# Patient Record
Sex: FEMALE | Race: WHITE | Employment: FULL TIME | URBAN - METROPOLITAN AREA
[De-identification: names, ages, dates, MRNs, and addresses within clinical notes are randomized per-mention and may not be internally consistent; named-entity substitution may affect disease eponyms.]

---

## 2022-03-28 ENCOUNTER — OFFICE VISIT (OUTPATIENT)
Dept: FAMILY MEDICINE CLINIC | Age: 47
End: 2022-03-28
Payer: COMMERCIAL

## 2022-03-28 VITALS
HEIGHT: 63 IN | DIASTOLIC BLOOD PRESSURE: 84 MMHG | SYSTOLIC BLOOD PRESSURE: 130 MMHG | HEART RATE: 90 BPM | OXYGEN SATURATION: 98 % | WEIGHT: 176 LBS | BODY MASS INDEX: 31.18 KG/M2

## 2022-03-28 DIAGNOSIS — Z13.29 SCREENING FOR THYROID DISORDER: ICD-10-CM

## 2022-03-28 DIAGNOSIS — Z13.220 SCREENING FOR LIPID DISORDERS: ICD-10-CM

## 2022-03-28 DIAGNOSIS — Z13.1 SCREENING FOR DIABETES MELLITUS: ICD-10-CM

## 2022-03-28 DIAGNOSIS — Z00.00 ENCOUNTER FOR WELL ADULT EXAM WITHOUT ABNORMAL FINDINGS: Primary | ICD-10-CM

## 2022-03-28 DIAGNOSIS — H61.21 IMPACTED CERUMEN OF RIGHT EAR: ICD-10-CM

## 2022-03-28 DIAGNOSIS — Z12.11 SCREENING FOR COLON CANCER: ICD-10-CM

## 2022-03-28 PROCEDURE — 69209 REMOVE IMPACTED EAR WAX UNI: CPT | Performed by: NURSE PRACTITIONER

## 2022-03-28 PROCEDURE — 99214 OFFICE O/P EST MOD 30 MIN: CPT | Performed by: NURSE PRACTITIONER

## 2022-03-28 RX ORDER — ESCITALOPRAM OXALATE 5 MG/1
5 TABLET ORAL DAILY
COMMUNITY

## 2022-03-28 RX ORDER — OXYBUTYNIN CHLORIDE 5 MG/1
TABLET, EXTENDED RELEASE ORAL
COMMUNITY
Start: 2022-01-27

## 2022-03-28 ASSESSMENT — PATIENT HEALTH QUESTIONNAIRE - PHQ9
SUM OF ALL RESPONSES TO PHQ9 QUESTIONS 1 & 2: 0
2. FEELING DOWN, DEPRESSED OR HOPELESS: 0
SUM OF ALL RESPONSES TO PHQ QUESTIONS 1-9: 0
1. LITTLE INTEREST OR PLEASURE IN DOING THINGS: 0

## 2022-03-28 ASSESSMENT — ENCOUNTER SYMPTOMS
GASTROINTESTINAL NEGATIVE: 1
RESPIRATORY NEGATIVE: 1
EYES NEGATIVE: 1

## 2022-03-28 NOTE — PROGRESS NOTES
Well Adult Note  Name: Larry Christopher Date: 3/28/2022   MRN: [id number] Sex: Female   Age: 42 y.o. Ethnicity: Non- / Non    : 1975 Race: White (non-)      Penny Martin is here for well adult exam.  History:  Patient with a medical hx of urinary incontinence anxiety and depression is here for a physical . She needs physical for work. She denied any complains       Review of Systems   Constitutional: Negative. HENT: Negative. Eyes: Negative. Respiratory: Negative. Cardiovascular: Negative. Gastrointestinal: Negative. Endocrine: Negative. Genitourinary: Negative. Musculoskeletal: Negative. Skin: Negative. Neurological: Negative. Hematological: Negative. Psychiatric/Behavioral: Negative. No Known Allergies      Prior to Visit Medications    Medication Sig Taking? Authorizing Provider   oxybutynin (DITROPAN-XL) 5 MG extended release tablet TAKE 1 TABLET BY MOUTH EVERY DAY Yes Historical Provider, MD   escitalopram (LEXAPRO) 5 MG tablet Take 5 mg by mouth daily  Patient not taking: Reported on 3/28/2022  Historical Provider, MD       History reviewed. No pertinent past medical history. Past Surgical History:   Procedure Laterality Date    HYSTERECTOMY, VAGINAL      TUBAL LIGATION           Family History   Problem Relation Age of Onset    No Known Problems Mother     Epilepsy Father     Stroke Father     Dementia Father     No Known Problems Sister     No Known Problems Brother     No Known Problems Daughter     No Known Problems Son     No Known Problems Sister     No Known Problems Brother        Social History     Tobacco Use    Smoking status: Never Smoker    Smokeless tobacco: Never Used   Vaping Use    Vaping Use: Not on file   Substance Use Topics    Alcohol use:  Yes     Alcohol/week: 4.0 - 6.0 standard drinks     Types: 4 - 6 Cans of beer per week     Comment: lian    Drug use: Never       Objective Cervical cancer screen  Never done    Diabetes screen  Never done    Lipid screen  Never done    Flu vaccine (1) 03/28/2023 (Originally 9/1/2021)    Hepatitis A vaccine  Aged Out    Hepatitis B vaccine  Aged Out    Hib vaccine  Aged Out    Meningococcal (ACWY) vaccine  Aged Out    Pneumococcal 0-64 years Vaccine  Aged Out    Hepatitis C screen  Discontinued    HIV screen  Discontinued     Recommendations for Preventive Services Due: see orders and patient instructions/AVS.    No follow-ups on file.

## 2022-03-29 DIAGNOSIS — Z13.220 SCREENING FOR LIPID DISORDERS: ICD-10-CM

## 2022-03-29 DIAGNOSIS — E03.9 HYPOTHYROIDISM, UNSPECIFIED TYPE: Primary | ICD-10-CM

## 2022-03-29 LAB
A/G RATIO: 1.6 (ref 1.1–2.2)
ALBUMIN SERPL-MCNC: 4.4 G/DL (ref 3.4–5)
ALP BLD-CCNC: 49 U/L (ref 40–129)
ALT SERPL-CCNC: 19 U/L (ref 10–40)
ANION GAP SERPL CALCULATED.3IONS-SCNC: 17 MMOL/L (ref 3–16)
AST SERPL-CCNC: 23 U/L (ref 15–37)
BASOPHILS ABSOLUTE: 0.1 K/UL (ref 0–0.2)
BASOPHILS RELATIVE PERCENT: 0.9 %
BILIRUB SERPL-MCNC: 0.4 MG/DL (ref 0–1)
BUN BLDV-MCNC: 12 MG/DL (ref 7–20)
CALCIUM SERPL-MCNC: 9.5 MG/DL (ref 8.3–10.6)
CHLORIDE BLD-SCNC: 108 MMOL/L (ref 99–110)
CHOLESTEROL, TOTAL: 210 MG/DL (ref 0–199)
CO2: 19 MMOL/L (ref 21–32)
CREAT SERPL-MCNC: 0.7 MG/DL (ref 0.6–1.1)
EOSINOPHILS ABSOLUTE: 0.4 K/UL (ref 0–0.6)
EOSINOPHILS RELATIVE PERCENT: 5.6 %
ESTIMATED AVERAGE GLUCOSE: 108.3 MG/DL
GFR AFRICAN AMERICAN: >60
GFR NON-AFRICAN AMERICAN: >60
GLUCOSE BLD-MCNC: 90 MG/DL (ref 70–99)
HBA1C MFR BLD: 5.4 %
HCT VFR BLD CALC: 37.1 % (ref 36–48)
HDLC SERPL-MCNC: 75 MG/DL (ref 40–60)
HEMOGLOBIN: 12.3 G/DL (ref 12–16)
LDL CHOLESTEROL CALCULATED: 119 MG/DL
LYMPHOCYTES ABSOLUTE: 2.4 K/UL (ref 1–5.1)
LYMPHOCYTES RELATIVE PERCENT: 29.5 %
MCH RBC QN AUTO: 27.1 PG (ref 26–34)
MCHC RBC AUTO-ENTMCNC: 33.1 G/DL (ref 31–36)
MCV RBC AUTO: 81.9 FL (ref 80–100)
MONOCYTES ABSOLUTE: 0.4 K/UL (ref 0–1.3)
MONOCYTES RELATIVE PERCENT: 5.2 %
NEUTROPHILS ABSOLUTE: 4.7 K/UL (ref 1.7–7.7)
NEUTROPHILS RELATIVE PERCENT: 58.8 %
PDW BLD-RTO: 15.2 % (ref 12.4–15.4)
PLATELET # BLD: 333 K/UL (ref 135–450)
PMV BLD AUTO: 9.2 FL (ref 5–10.5)
POTASSIUM SERPL-SCNC: 4.2 MMOL/L (ref 3.5–5.1)
RBC # BLD: 4.54 M/UL (ref 4–5.2)
SODIUM BLD-SCNC: 144 MMOL/L (ref 136–145)
T4 FREE: 1.1 NG/DL (ref 0.9–1.8)
TOTAL PROTEIN: 7.2 G/DL (ref 6.4–8.2)
TRIGL SERPL-MCNC: 80 MG/DL (ref 0–150)
TSH SERPL DL<=0.05 MIU/L-ACNC: 5.21 UIU/ML (ref 0.27–4.2)
VLDLC SERPL CALC-MCNC: 16 MG/DL
WBC # BLD: 8 K/UL (ref 4–11)

## 2022-03-29 RX ORDER — LEVOTHYROXINE SODIUM 0.03 MG/1
25 TABLET ORAL DAILY
Qty: 30 TABLET | Refills: 5 | Status: SHIPPED | OUTPATIENT
Start: 2022-03-29

## 2023-03-06 ENCOUNTER — TELEPHONE (OUTPATIENT)
Dept: FAMILY MEDICINE CLINIC | Age: 48
End: 2023-03-06

## 2023-03-06 NOTE — TELEPHONE ENCOUNTER
Attempted to call pt, rang once, someone picked up phone then hung up    Last physical was 03/28/2022, pt cannot have physical before then due to insurance coverage

## 2023-03-06 NOTE — TELEPHONE ENCOUNTER
----- Message from Mariposa Foster sent at 3/6/2023  8:32 AM EST -----  Subject: Message to Provider    QUESTIONS  Information for Provider? Patient requesting to reschedule her appointment   on 3/29/2023 2:30 PM if possible to be with her PCP for her annual   physical before 3/31/23.   ---------------------------------------------------------------------------  --------------  Michael Love INFO  [phone]; OK to leave message on voicemail  ---------------------------------------------------------------------------  --------------  SCRIPT ANSWERS  Relationship to Patient?  Self

## 2023-03-29 ENCOUNTER — OFFICE VISIT (OUTPATIENT)
Dept: FAMILY MEDICINE CLINIC | Age: 48
End: 2023-03-29

## 2023-03-29 VITALS
HEIGHT: 63 IN | SYSTOLIC BLOOD PRESSURE: 116 MMHG | BODY MASS INDEX: 30.79 KG/M2 | DIASTOLIC BLOOD PRESSURE: 82 MMHG | HEART RATE: 93 BPM | OXYGEN SATURATION: 98 % | RESPIRATION RATE: 16 BRPM | WEIGHT: 173.8 LBS

## 2023-03-29 DIAGNOSIS — E03.9 HYPOTHYROIDISM, UNSPECIFIED TYPE: ICD-10-CM

## 2023-03-29 DIAGNOSIS — Z00.00 PREVENTATIVE HEALTH CARE: ICD-10-CM

## 2023-03-29 DIAGNOSIS — Z12.11 SCREENING FOR COLON CANCER: Primary | ICD-10-CM

## 2023-03-29 DIAGNOSIS — F32.0 CURRENT MILD EPISODE OF MAJOR DEPRESSIVE DISORDER WITHOUT PRIOR EPISODE (HCC): ICD-10-CM

## 2023-03-29 DIAGNOSIS — H61.21 IMPACTED CERUMEN, RIGHT EAR: ICD-10-CM

## 2023-03-29 DIAGNOSIS — Z13.220 SCREENING FOR LIPID DISORDERS: ICD-10-CM

## 2023-03-29 RX ORDER — LEVOTHYROXINE SODIUM 0.03 MG/1
25 TABLET ORAL DAILY
Qty: 30 TABLET | Refills: 5 | Status: SHIPPED | OUTPATIENT
Start: 2023-03-29

## 2023-03-29 RX ORDER — ESCITALOPRAM OXALATE 20 MG/1
20 TABLET ORAL DAILY
Qty: 30 TABLET | Refills: 0 | Status: SHIPPED | OUTPATIENT
Start: 2023-03-29 | End: 2023-04-28

## 2023-03-29 RX ORDER — OXYBUTYNIN CHLORIDE 10 MG/1
10 TABLET, EXTENDED RELEASE ORAL DAILY
Qty: 30 TABLET | Refills: 3 | Status: SHIPPED | OUTPATIENT
Start: 2023-03-29

## 2023-03-29 ASSESSMENT — PATIENT HEALTH QUESTIONNAIRE - PHQ9
SUM OF ALL RESPONSES TO PHQ9 QUESTIONS 1 & 2: 6
SUM OF ALL RESPONSES TO PHQ QUESTIONS 1-9: 16
8. MOVING OR SPEAKING SO SLOWLY THAT OTHER PEOPLE COULD HAVE NOTICED. OR THE OPPOSITE, BEING SO FIGETY OR RESTLESS THAT YOU HAVE BEEN MOVING AROUND A LOT MORE THAN USUAL: 1
2. FEELING DOWN, DEPRESSED OR HOPELESS: 3
3. TROUBLE FALLING OR STAYING ASLEEP: 3
9. THOUGHTS THAT YOU WOULD BE BETTER OFF DEAD, OR OF HURTING YOURSELF: 0
1. LITTLE INTEREST OR PLEASURE IN DOING THINGS: 3
SUM OF ALL RESPONSES TO PHQ QUESTIONS 1-9: 16
5. POOR APPETITE OR OVEREATING: 2
SUM OF ALL RESPONSES TO PHQ QUESTIONS 1-9: 16
7. TROUBLE CONCENTRATING ON THINGS, SUCH AS READING THE NEWSPAPER OR WATCHING TELEVISION: 1
10. IF YOU CHECKED OFF ANY PROBLEMS, HOW DIFFICULT HAVE THESE PROBLEMS MADE IT FOR YOU TO DO YOUR WORK, TAKE CARE OF THINGS AT HOME, OR GET ALONG WITH OTHER PEOPLE: 1
4. FEELING TIRED OR HAVING LITTLE ENERGY: 3
SUM OF ALL RESPONSES TO PHQ QUESTIONS 1-9: 16
6. FEELING BAD ABOUT YOURSELF - OR THAT YOU ARE A FAILURE OR HAVE LET YOURSELF OR YOUR FAMILY DOWN: 0

## 2023-03-29 ASSESSMENT — ENCOUNTER SYMPTOMS
COUGH: 0
WHEEZING: 0
ABDOMINAL PAIN: 0
COLOR CHANGE: 0
SORE THROAT: 0
SHORTNESS OF BREATH: 0
BLOOD IN STOOL: 0
DIARRHEA: 0
CONSTIPATION: 0

## 2023-03-29 NOTE — PROGRESS NOTES
Well Adult Note  Name: Becca Foster Date: 3/29/2023   MRN: [id number] Sex: Female   Age: 41 y.o. Ethnicity: Non- / Non    : 1975 Race: White (non-)      Yenifer List is here for well adult exam.  History: Hypothyroidism/anxiety and depression. Review of Systems   HENT:  Negative for congestion and sore throat. Respiratory:  Negative for cough, shortness of breath and wheezing. Cardiovascular:  Negative for chest pain and leg swelling. Gastrointestinal:  Negative for abdominal pain, blood in stool, constipation and diarrhea. Endocrine: Negative for cold intolerance and heat intolerance. Genitourinary:  Negative for difficulty urinating, hematuria and urgency. Musculoskeletal:  Negative for arthralgias and gait problem. Skin:  Negative for color change. Neurological:  Negative for dizziness, seizures, light-headedness and headaches. Psychiatric/Behavioral:  Negative for agitation and confusion. The patient is not nervous/anxious. No Known Allergies      Prior to Visit Medications    Medication Sig Taking? Authorizing Provider   levothyroxine (SYNTHROID) 25 MCG tablet Take 1 tablet by mouth daily Yes TWIN Ramos CNP   escitalopram (LEXAPRO) 20 MG tablet Take 1 tablet by mouth daily Yes TWIN Ramos CNP   oxybutynin (DITROPAN XL) 10 MG extended release tablet Take 1 tablet by mouth daily Yes TWIN Ramos CNP       History reviewed. No pertinent past medical history.     Past Surgical History:   Procedure Laterality Date    HYSTERECTOMY, VAGINAL      TUBAL LIGATION           Family History   Problem Relation Age of Onset    No Known Problems Mother     Epilepsy Father     Stroke Father     Dementia Father     No Known Problems Sister     No Known Problems Brother     No Known Problems Daughter     No Known Problems Son     No Known Problems Sister     No Known Problems Brother        Social History     Tobacco Use

## 2023-03-30 LAB
ALBUMIN SERPL-MCNC: 4.6 G/DL (ref 3.4–5)
ALBUMIN/GLOB SERPL: 1.8 {RATIO} (ref 1.1–2.2)
ALP SERPL-CCNC: 51 U/L (ref 40–129)
ALT SERPL-CCNC: 36 U/L (ref 10–40)
ANION GAP SERPL CALCULATED.3IONS-SCNC: 14 MMOL/L (ref 3–16)
AST SERPL-CCNC: 25 U/L (ref 15–37)
BASOPHILS # BLD: 0.1 K/UL (ref 0–0.2)
BASOPHILS NFR BLD: 0.7 %
BILIRUB SERPL-MCNC: 0.4 MG/DL (ref 0–1)
BUN SERPL-MCNC: 14 MG/DL (ref 7–20)
CALCIUM SERPL-MCNC: 9.7 MG/DL (ref 8.3–10.6)
CHLORIDE SERPL-SCNC: 104 MMOL/L (ref 99–110)
CHOLEST SERPL-MCNC: 193 MG/DL (ref 0–199)
CO2 SERPL-SCNC: 23 MMOL/L (ref 21–32)
CREAT SERPL-MCNC: 0.7 MG/DL (ref 0.6–1.1)
DEPRECATED RDW RBC AUTO: 15.3 % (ref 12.4–15.4)
EOSINOPHIL # BLD: 0.1 K/UL (ref 0–0.6)
EOSINOPHIL NFR BLD: 1.7 %
GFR SERPLBLD CREATININE-BSD FMLA CKD-EPI: >60 ML/MIN/{1.73_M2}
GLUCOSE SERPL-MCNC: 87 MG/DL (ref 70–99)
HCT VFR BLD AUTO: 41.6 % (ref 36–48)
HDLC SERPL-MCNC: 60 MG/DL (ref 40–60)
HGB BLD-MCNC: 13.4 G/DL (ref 12–16)
LDLC SERPL CALC-MCNC: 118 MG/DL
LYMPHOCYTES # BLD: 2.6 K/UL (ref 1–5.1)
LYMPHOCYTES NFR BLD: 29.1 %
MCH RBC QN AUTO: 27.6 PG (ref 26–34)
MCHC RBC AUTO-ENTMCNC: 32.1 G/DL (ref 31–36)
MCV RBC AUTO: 85.9 FL (ref 80–100)
MONOCYTES # BLD: 0.5 K/UL (ref 0–1.3)
MONOCYTES NFR BLD: 5.7 %
NEUTROPHILS # BLD: 5.6 K/UL (ref 1.7–7.7)
NEUTROPHILS NFR BLD: 62.8 %
PLATELET # BLD AUTO: 310 K/UL (ref 135–450)
PMV BLD AUTO: 10.1 FL (ref 5–10.5)
POTASSIUM SERPL-SCNC: 4.5 MMOL/L (ref 3.5–5.1)
PROT SERPL-MCNC: 7.2 G/DL (ref 6.4–8.2)
RBC # BLD AUTO: 4.85 M/UL (ref 4–5.2)
SODIUM SERPL-SCNC: 141 MMOL/L (ref 136–145)
TRIGL SERPL-MCNC: 73 MG/DL (ref 0–150)
TSH SERPL DL<=0.005 MIU/L-ACNC: 2.5 UIU/ML (ref 0.27–4.2)
VLDLC SERPL CALC-MCNC: 15 MG/DL
WBC # BLD AUTO: 8.9 K/UL (ref 4–11)

## 2023-04-14 ENCOUNTER — TELEPHONE (OUTPATIENT)
Dept: FAMILY MEDICINE CLINIC | Age: 48
End: 2023-04-14

## 2023-04-19 ENCOUNTER — TELEPHONE (OUTPATIENT)
Dept: FAMILY MEDICINE CLINIC | Age: 48
End: 2023-04-19

## 2023-05-01 ENCOUNTER — OFFICE VISIT (OUTPATIENT)
Dept: FAMILY MEDICINE CLINIC | Age: 48
End: 2023-05-01
Payer: COMMERCIAL

## 2023-05-01 VITALS
BODY MASS INDEX: 30.44 KG/M2 | DIASTOLIC BLOOD PRESSURE: 72 MMHG | SYSTOLIC BLOOD PRESSURE: 136 MMHG | OXYGEN SATURATION: 99 % | RESPIRATION RATE: 16 BRPM | HEART RATE: 75 BPM | WEIGHT: 171.8 LBS | HEIGHT: 63 IN

## 2023-05-01 DIAGNOSIS — F41.9 ANXIETY AND DEPRESSION: Primary | ICD-10-CM

## 2023-05-01 DIAGNOSIS — F32.A ANXIETY AND DEPRESSION: Primary | ICD-10-CM

## 2023-05-01 PROCEDURE — 99214 OFFICE O/P EST MOD 30 MIN: CPT

## 2023-05-01 RX ORDER — VENLAFAXINE HYDROCHLORIDE 37.5 MG/1
37.5 CAPSULE, EXTENDED RELEASE ORAL DAILY
Qty: 30 CAPSULE | Refills: 0 | Status: SHIPPED | OUTPATIENT
Start: 2023-05-01

## 2023-05-01 RX ORDER — BUSPIRONE HYDROCHLORIDE 10 MG/1
10 TABLET ORAL 2 TIMES DAILY
Qty: 60 TABLET | Refills: 0 | Status: SHIPPED | OUTPATIENT
Start: 2023-05-01 | End: 2023-05-31

## 2023-05-01 ASSESSMENT — PATIENT HEALTH QUESTIONNAIRE - PHQ9
1. LITTLE INTEREST OR PLEASURE IN DOING THINGS: 3
SUM OF ALL RESPONSES TO PHQ QUESTIONS 1-9: 17
3. TROUBLE FALLING OR STAYING ASLEEP: 3
6. FEELING BAD ABOUT YOURSELF - OR THAT YOU ARE A FAILURE OR HAVE LET YOURSELF OR YOUR FAMILY DOWN: 2
8. MOVING OR SPEAKING SO SLOWLY THAT OTHER PEOPLE COULD HAVE NOTICED. OR THE OPPOSITE, BEING SO FIGETY OR RESTLESS THAT YOU HAVE BEEN MOVING AROUND A LOT MORE THAN USUAL: 0
4. FEELING TIRED OR HAVING LITTLE ENERGY: 3
2. FEELING DOWN, DEPRESSED OR HOPELESS: 2
7. TROUBLE CONCENTRATING ON THINGS, SUCH AS READING THE NEWSPAPER OR WATCHING TELEVISION: 1
SUM OF ALL RESPONSES TO PHQ9 QUESTIONS 1 & 2: 5
9. THOUGHTS THAT YOU WOULD BE BETTER OFF DEAD, OR OF HURTING YOURSELF: 0
SUM OF ALL RESPONSES TO PHQ QUESTIONS 1-9: 17
10. IF YOU CHECKED OFF ANY PROBLEMS, HOW DIFFICULT HAVE THESE PROBLEMS MADE IT FOR YOU TO DO YOUR WORK, TAKE CARE OF THINGS AT HOME, OR GET ALONG WITH OTHER PEOPLE: 3
SUM OF ALL RESPONSES TO PHQ QUESTIONS 1-9: 17
5. POOR APPETITE OR OVEREATING: 3
SUM OF ALL RESPONSES TO PHQ QUESTIONS 1-9: 17

## 2023-05-01 ASSESSMENT — ENCOUNTER SYMPTOMS
SORE THROAT: 0
CONSTIPATION: 0
COLOR CHANGE: 0
BLOOD IN STOOL: 0
SHORTNESS OF BREATH: 0
ABDOMINAL PAIN: 0
COUGH: 0
WHEEZING: 0
DIARRHEA: 0

## 2023-05-01 NOTE — PROGRESS NOTES
Joshua Fregoso (:  1975) is a 41 y.o. female,Established patient, here for evaluation of the following chief complaint(s): Anxiety (Pt is here for anxiety ) and Depression         ASSESSMENT/PLAN:  1. Anxiety and depression  -     venlafaxine (EFFEXOR XR) 37.5 MG extended release capsule; Take 1 capsule by mouth daily, Disp-30 capsule, R-0Normal  -     busPIRone (BUSPAR) 10 MG tablet; Take 1 tablet by mouth 2 times daily, Disp-60 tablet, R-0Normal  -Patient's anxiety and depression were not currently managed with her Lexapro 20 mg.  I do have some concern of unstable mood. However we will plan to start patient on Effexor and buspirone. Patient was educated if she has worsening manic episodes or increase in risky behavior that she needs to reach out to the office right away. Patient could benefit from mood stabilizing medication. Would consider Abilify or Viibryd if patient does not do well or in conjunction with Effexor and buspirone. Patient was again educated on side effects of medications listed above. Patient is agreeable and understands plan at this time. Patient was educated if she has worsening thoughts of harming her self or others to seek care right away. Patient is agreeable and understands plan at this time. Did spend lengthy amount of time with patient discussing her current and past history that has led her to seek help. Return in about 4 weeks (around 2023) for Mood . Subjective   SUBJECTIVE/OBJECTIVE:  HPI  Patient presents to the office today regarding her mood. During patient's last visit her Lexapro dose was increased from 10 to 20 mg. Patient presents to the office today to have further discussion regarding her mood. During patient's first office visit patient was reluctant to share status over her mood. Patient presents to the office today further willing to discuss her mood. Patient is suffering from depression and anxiety.   Patient has a number of

## 2023-05-04 ENCOUNTER — TELEPHONE (OUTPATIENT)
Dept: FAMILY MEDICINE CLINIC | Age: 48
End: 2023-05-04

## 2023-05-04 NOTE — TELEPHONE ENCOUNTER
Patient was informed that her [facility] paperwork was faxed on 5/4/2023 and she just wanted to let GAYATRI Forrester know that she was not able to go into work today.     DEBORAH

## 2023-05-09 ENCOUNTER — TELEPHONE (OUTPATIENT)
Dept: FAMILY MEDICINE CLINIC | Age: 48
End: 2023-05-09

## 2023-05-09 DIAGNOSIS — F32.0 CURRENT MILD EPISODE OF MAJOR DEPRESSIVE DISORDER WITHOUT PRIOR EPISODE (HCC): ICD-10-CM

## 2023-05-09 RX ORDER — ESCITALOPRAM OXALATE 20 MG/1
TABLET ORAL
Qty: 30 TABLET | Refills: 0 | OUTPATIENT
Start: 2023-05-09

## 2023-05-09 NOTE — TELEPHONE ENCOUNTER
Pt states she needs FMLA paperwork to take days off at her work until May 15th which is when she has appointment with her psychiatrist. Pt states she does not go to work a lot because she has no motivation or energy to even get up from the bed. She feels anxious and withdrawn. She often has meltdowns. She is taking her buspirone and venlafaxine regularly, but it is only helping her calm down. It does not help with low energy.

## 2023-05-12 ENCOUNTER — OFFICE VISIT (OUTPATIENT)
Dept: FAMILY MEDICINE CLINIC | Age: 48
End: 2023-05-12
Payer: COMMERCIAL

## 2023-05-12 VITALS
RESPIRATION RATE: 16 BRPM | HEART RATE: 84 BPM | HEIGHT: 63 IN | OXYGEN SATURATION: 98 % | SYSTOLIC BLOOD PRESSURE: 126 MMHG | DIASTOLIC BLOOD PRESSURE: 82 MMHG | WEIGHT: 169.6 LBS | BODY MASS INDEX: 30.05 KG/M2

## 2023-05-12 DIAGNOSIS — F41.9 ANXIETY AND DEPRESSION: ICD-10-CM

## 2023-05-12 DIAGNOSIS — F32.A ANXIETY AND DEPRESSION: ICD-10-CM

## 2023-05-12 PROCEDURE — 99213 OFFICE O/P EST LOW 20 MIN: CPT

## 2023-05-12 ASSESSMENT — ENCOUNTER SYMPTOMS
SHORTNESS OF BREATH: 0
ABDOMINAL PAIN: 0
COUGH: 0
DIARRHEA: 0
WHEEZING: 0
COLOR CHANGE: 0
SORE THROAT: 0
BLOOD IN STOOL: 0
CONSTIPATION: 0

## 2023-05-12 NOTE — PROGRESS NOTES
Lubna Bullock (:  1975) is a 41 y.o. female,Established patient, here for evaluation of the following chief complaint(s): Anxiety (Pt is here to discuss medications for anxiety and depression as well as FMLA for her work ) and Depression         ASSESSMENT/PLAN:  1. Anxiety and depression  -Patient describes having little energy and lack of motivation. This is causing her to miss work. Patient states that she was normally doing fairly well with her mood was able to push through but recently she does feels that she is more down and unable to push through like she previously was. Patient endorses that she feels like the medication is helping her anxiety and her mood but has been only on the medication for less than 2 weeks. She endorses the medication is making her more calm but has not helped her depressive symptoms at this time. Patient denies any thoughts of harming her self or others. - Patient will see psychology on Monday. Plan at this time is to continue patient's current medications of Effexor and buspirone without any changes to medication. Encourage patient to continue taking the medication over the next few weeks as long as she is not having thoughts of harming herself or others. Patient did endorse that the medication was helping with her impulsive behaviors which have now substantially lessened. Again patient denied any thoughts of harm herself or others at this time. We will plan to continue medication patient was educated to reach out if she has more questions or concerns about medications.    -We will readjust [facility] paperwork since initial paperwork was not completed correctly. Return for previous appointment in 2 weeks. Subjective   SUBJECTIVE/OBJECTIVE:  HPI  Patient presents to the office today to further test her medications as well as her family paperwork.   Patient was placed on Effexor and buspirone for her anxiety and depression at last visit less than 2 weeks

## 2023-05-15 ENCOUNTER — OFFICE VISIT (OUTPATIENT)
Dept: PSYCHOLOGY | Age: 48
End: 2023-05-15

## 2023-05-15 ENCOUNTER — TELEPHONE (OUTPATIENT)
Dept: PSYCHOLOGY | Age: 48
End: 2023-05-15

## 2023-05-15 DIAGNOSIS — F33.1 MDD (MAJOR DEPRESSIVE DISORDER), RECURRENT EPISODE, MODERATE (HCC): Primary | ICD-10-CM

## 2023-05-15 DIAGNOSIS — F41.9 ANXIETY DISORDER, UNSPECIFIED TYPE: ICD-10-CM

## 2023-05-15 ASSESSMENT — ANXIETY QUESTIONNAIRES
6. BECOMING EASILY ANNOYED OR IRRITABLE: 1-SEVERAL DAYS
5. BEING SO RESTLESS THAT IT IS HARD TO SIT STILL: 1-SEVERAL DAYS
1. FEELING NERVOUS, ANXIOUS, OR ON EDGE: 3
3. WORRYING TOO MUCH ABOUT DIFFERENT THINGS: 3-NEARLY EVERY DAY
2. NOT BEING ABLE TO STOP OR CONTROL WORRYING: 3-NEARLY EVERY DAY
7. FEELING AFRAID AS IF SOMETHING AWFUL MIGHT HAPPEN: 0-NOT AT ALL
GAD7 TOTAL SCORE: 12
4. TROUBLE RELAXING: 1-SEVERAL DAYS

## 2023-05-15 ASSESSMENT — PATIENT HEALTH QUESTIONNAIRE - PHQ9
2. FEELING DOWN, DEPRESSED OR HOPELESS: 3
4. FEELING TIRED OR HAVING LITTLE ENERGY: 3
5. POOR APPETITE OR OVEREATING: 2
SUM OF ALL RESPONSES TO PHQ QUESTIONS 1-9: 23
7. TROUBLE CONCENTRATING ON THINGS, SUCH AS READING THE NEWSPAPER OR WATCHING TELEVISION: 3
SUM OF ALL RESPONSES TO PHQ QUESTIONS 1-9: 23
SUM OF ALL RESPONSES TO PHQ QUESTIONS 1-9: 23
8. MOVING OR SPEAKING SO SLOWLY THAT OTHER PEOPLE COULD HAVE NOTICED. OR THE OPPOSITE, BEING SO FIGETY OR RESTLESS THAT YOU HAVE BEEN MOVING AROUND A LOT MORE THAN USUAL: 3
10. IF YOU CHECKED OFF ANY PROBLEMS, HOW DIFFICULT HAVE THESE PROBLEMS MADE IT FOR YOU TO DO YOUR WORK, TAKE CARE OF THINGS AT HOME, OR GET ALONG WITH OTHER PEOPLE: 3
1. LITTLE INTEREST OR PLEASURE IN DOING THINGS: 3
SUM OF ALL RESPONSES TO PHQ QUESTIONS 1-9: 23
9. THOUGHTS THAT YOU WOULD BE BETTER OFF DEAD, OR OF HURTING YOURSELF: 0
SUM OF ALL RESPONSES TO PHQ9 QUESTIONS 1 & 2: 6
3. TROUBLE FALLING OR STAYING ASLEEP: 3
6. FEELING BAD ABOUT YOURSELF - OR THAT YOU ARE A FAILURE OR HAVE LET YOURSELF OR YOUR FAMILY DOWN: 3

## 2023-05-15 NOTE — PATIENT INSTRUCTIONS
10 common symptoms of depression:      Significantly sad or irritable moods  Sleep problems (too little/too much)  Lack of interest in others or in activities normally enjoyed  Guilt, self-critical thoughts, feelings of inadequacy or worthlessness  Poor energy/feeling tired most of the time  Concentration/memory difficulties  Appetite/eating changes - poor or excessive appetite/eating  Feeling very slowed down or restless and on edge  More aches and pains or physical complaints  Thoughts of death or suicide    What causes depression? If these symptoms are persistent (lasting two weeks or more) and are severe enough to impact your functioning or quality of life, this may indicate the presence of clinical depression. Depression is a complex problem that has multiple interrelated causes or influences. Some possible causes of depression include the following:    Decreases in rewarding experiences  Problems in relationships   Chemical/biological imbalance  Poor communication  Alvord negative thinking about oneself or situations  Alvord focusing on problems rather than solutions  Lack of meaning/purpose in life    It is important to note that there is rarely one single cause of depression. It is probable that if you are depressed, many of the causes described above are playing some role to some degree. Therefore, the more coping strategies you adopt over time to address the various causes of depression described above, the more successful you will be in reducing your depression. Depression is an extremely common problem    Estimates are that up to 25 million, or one in ten Americans, experience depression each year and that over half of us will experience a depressive episode at some time in our lives. Depression has been called the worlds number one public health problem both because it is so prevalent and because it makes any other pre-existing health problems even worse.   The fact that a large

## 2023-05-15 NOTE — PROGRESS NOTES
Behavioral Health Consultation  Daniel Avila, Ph.D.  Psychologist  5/15/2023  10:32 AM EDT      Time spent with Patient: 45 minutes  This is patient's first [facility] appointment. Reason for Consult:    Chief Complaint   Patient presents with    Depression    Anxiety     Referring Provider: TWIN Merritt - CNP  [address]    Pt provided informed consent for the behavioral health program. Discussed with patient model of service to include the limits of confidentiality (i.e. abuse reporting, suicide intervention, etc.) and short-term intervention focused approach. Pt indicated understanding. Feedback given to PCP. S:  Patient presents with concerns about depression and anxiety. Sx have been present for most of her life but they have been worse in the last 5 years or so since a long-term relationship ended. Patient reports depressive symptoms, including  increased tearfulness, depressed mood, deactivation, anhedonia, poor self-worth, social isolation, decreased appetite, insomnia/hypersomnia, fatigue, psychomotor retardation, and poor concentration/focus. Pt reports symptoms of anxiety, including racing thoughts, poor concentration/focus, restlessness, insomnia, and fatigue. Pt also reports tachycardia, headaches, GI distress, and decreased appetite. Sx are aggravated by work-related stress. She admits to engaging in avoidant coping. Patient finds relief from gambling, being with her dog, journaling, self-affirmations. Goals for treatment include developing better coping skills, improving functional ability. Patient lives with her daughter and daughter's boyfriend. They have 3 dogs and 2 cats. She has an adult son who lives outside of the home. Patient works full-time as a hospice nurse. Daily routine is variable. Daily caffeine use includes 1-2 sugar-free Red Bull and up to 3 cans of Pepsi. No cigarette use, no alcohol use since February, no illegal drug use.  Patient

## 2023-05-15 NOTE — TELEPHONE ENCOUNTER
Patient is concerned about her [facility] paperwork being completed and approved. I have encouraged her to return to work and will continue to do so, but I agree that intermittent leave is appropriate for her. Let me know if I can help with anything.

## 2023-05-17 ENCOUNTER — TELEPHONE (OUTPATIENT)
Dept: FAMILY MEDICINE CLINIC | Age: 48
End: 2023-05-17

## 2023-05-17 NOTE — TELEPHONE ENCOUNTER
Patient called and stated that her paperwork needs to be refaxed  Section 2 (office visits) needs to have initials and dates

## 2023-05-18 ENCOUNTER — TELEPHONE (OUTPATIENT)
Dept: FAMILY MEDICINE CLINIC | Age: 48
End: 2023-05-18

## 2023-05-18 NOTE — TELEPHONE ENCOUNTER
Patient notes her  stated that in order for her not to get fired at her job her paperwork has to stay 5 days a week and 24hrs per day  She thinks this is in part D  (Duration of flare ups)    She is going to drop off new paperwork tomorrow

## 2023-05-19 ENCOUNTER — TELEPHONE (OUTPATIENT)
Dept: PSYCHOLOGY | Age: 48
End: 2023-05-19

## 2023-05-19 ENCOUNTER — OFFICE VISIT (OUTPATIENT)
Dept: PSYCHOLOGY | Age: 48
End: 2023-05-19
Payer: COMMERCIAL

## 2023-05-19 DIAGNOSIS — F41.9 ANXIETY DISORDER, UNSPECIFIED TYPE: ICD-10-CM

## 2023-05-19 DIAGNOSIS — F33.1 MDD (MAJOR DEPRESSIVE DISORDER), RECURRENT EPISODE, MODERATE (HCC): Primary | ICD-10-CM

## 2023-05-19 PROCEDURE — 90832 PSYTX W PT 30 MINUTES: CPT | Performed by: PSYCHOLOGIST

## 2023-05-19 ASSESSMENT — PATIENT HEALTH QUESTIONNAIRE - PHQ9
SUM OF ALL RESPONSES TO PHQ QUESTIONS 1-9: 24
1. LITTLE INTEREST OR PLEASURE IN DOING THINGS: 3
SUM OF ALL RESPONSES TO PHQ QUESTIONS 1-9: 24
2. FEELING DOWN, DEPRESSED OR HOPELESS: 3
10. IF YOU CHECKED OFF ANY PROBLEMS, HOW DIFFICULT HAVE THESE PROBLEMS MADE IT FOR YOU TO DO YOUR WORK, TAKE CARE OF THINGS AT HOME, OR GET ALONG WITH OTHER PEOPLE: 3
SUM OF ALL RESPONSES TO PHQ QUESTIONS 1-9: 24
7. TROUBLE CONCENTRATING ON THINGS, SUCH AS READING THE NEWSPAPER OR WATCHING TELEVISION: 3
4. FEELING TIRED OR HAVING LITTLE ENERGY: 3
6. FEELING BAD ABOUT YOURSELF - OR THAT YOU ARE A FAILURE OR HAVE LET YOURSELF OR YOUR FAMILY DOWN: 3
9. THOUGHTS THAT YOU WOULD BE BETTER OFF DEAD, OR OF HURTING YOURSELF: 0
5. POOR APPETITE OR OVEREATING: 3
8. MOVING OR SPEAKING SO SLOWLY THAT OTHER PEOPLE COULD HAVE NOTICED. OR THE OPPOSITE, BEING SO FIGETY OR RESTLESS THAT YOU HAVE BEEN MOVING AROUND A LOT MORE THAN USUAL: 3
SUM OF ALL RESPONSES TO PHQ9 QUESTIONS 1 & 2: 6
3. TROUBLE FALLING OR STAYING ASLEEP: 3
SUM OF ALL RESPONSES TO PHQ QUESTIONS 1-9: 24

## 2023-05-19 ASSESSMENT — ANXIETY QUESTIONNAIRES
5. BEING SO RESTLESS THAT IT IS HARD TO SIT STILL: 1-SEVERAL DAYS
1. FEELING NERVOUS, ANXIOUS, OR ON EDGE: 3
7. FEELING AFRAID AS IF SOMETHING AWFUL MIGHT HAPPEN: 0-NOT AT ALL
3. WORRYING TOO MUCH ABOUT DIFFERENT THINGS: 3-NEARLY EVERY DAY
GAD7 TOTAL SCORE: 13
2. NOT BEING ABLE TO STOP OR CONTROL WORRYING: 3-NEARLY EVERY DAY
4. TROUBLE RELAXING: 2-OVER HALF THE DAYS
6. BECOMING EASILY ANNOYED OR IRRITABLE: 1-SEVERAL DAYS

## 2023-05-19 NOTE — PATIENT INSTRUCTIONS
Expect a call from Talita Foss or someone from [facility]. You can contact her at [phone].

## 2023-05-19 NOTE — TELEPHONE ENCOUNTER
I helped with pts FMLA paperwork and referred her to IOP at [facility]. She may need an increased dose of Effexor, but it's only been a few weeks. I told her to discuss with you, DEBORAH.

## 2023-05-19 NOTE — PROGRESS NOTES
Behavioral Health Consultation  Bella Noble, Ph.D.  Psychologist  5/19/2023  10:17 AM EDT      Time spent with Patient: 20 minutes  This is patient's second  [facility] appointment. Reason for Consult:    Chief Complaint   Patient presents with    Depression    Anxiety     Referring Provider: León Barriga, TWIN - CNP  [address]    Feedback given to PCP. S:  Patient reported that she has been off from work all week. Tried to go to work on Tuesday, but ended up back in bed all day. Feels some increase in energy today, hopes to go to work on Monday. Discussed referral to IOP. Patient also requested help with [place] paperwork. [facility] agreed to sign for intermittent leave and encouraged patient to return to work.     O:  MSE:    Appearance: good hygiene   Attitude: cooperative and friendly  Consciousness: alert  Orientation: oriented to person, place, time, general circumstance  Memory: recent and remote memory intact  Attention/Concentration: intact during session  Psychomotor Activity:normal  Eye Contact: normal  Speech: normal rate and volume, well-articulated  Mood: depressed  Affect: dysphoric  Perception: within normal limits  Thought Content: all-or-none thinking and intrusive thoughts  Thought Process: logical, coherent and goal-directed  Insight: good  Judgment: intact  Ability to understand instructions: Yes  Ability to respond meaningfully: Yes  Morbid Ideation: no   Suicide Assessment: no suicidal ideation, plan, or intent  Homicidal Ideation: no    History:    Medications:   Current Outpatient Medications   Medication Sig Dispense Refill    venlafaxine (EFFEXOR XR) 37.5 MG extended release capsule Take 1 capsule by mouth daily 30 capsule 0    busPIRone (BUSPAR) 10 MG tablet Take 1 tablet by mouth 2 times daily 60 tablet 0    levothyroxine (SYNTHROID) 25 MCG tablet Take 1 tablet by mouth daily 30 tablet 5    oxybutynin (DITROPAN XL) 10 MG extended release tablet Take 1

## 2023-05-29 DIAGNOSIS — F32.A ANXIETY AND DEPRESSION: ICD-10-CM

## 2023-05-29 DIAGNOSIS — F41.9 ANXIETY AND DEPRESSION: ICD-10-CM

## 2023-05-30 RX ORDER — BUSPIRONE HYDROCHLORIDE 10 MG/1
TABLET ORAL
Qty: 60 TABLET | Refills: 0 | Status: SHIPPED | OUTPATIENT
Start: 2023-05-30 | End: 2023-06-01 | Stop reason: DRUGHIGH

## 2023-05-30 RX ORDER — VENLAFAXINE HYDROCHLORIDE 37.5 MG/1
CAPSULE, EXTENDED RELEASE ORAL
Qty: 30 CAPSULE | Refills: 0 | Status: SHIPPED | OUTPATIENT
Start: 2023-05-30

## 2023-05-30 NOTE — TELEPHONE ENCOUNTER
Refill Request       Last Seen: Last Seen Department: 5/12/2023  Last Seen by PCP: 5/12/2023    Last Written: 05/01/2023        Next Appointment:   Future Appointments   Date Time Provider Mayelin Rachel   5/30/2023 10:30 AM Lauro Valente. [address] [place] - DYD   6/5/2023  4:00 PM Radha Bush, PhD  [facility]         Requested Prescriptions     Pending Prescriptions Disp Refills    venlafaxine (EFFEXOR XR) 37.5 MG extended release capsule [Pharmacy Med Name: VENLAFAXINE HCL ER 37.5 MG CAP] 30 capsule 0     Sig: TAKE 1 CAPSULE BY MOUTH EVERY DAY    busPIRone (BUSPAR) 10 MG tablet [Pharmacy Med Name: BUSPIRONE HCL 10 MG TABLET] 60 tablet 0     Sig: TAKE 1 TABLET BY MOUTH TWICE A DAY

## 2023-05-31 ENCOUNTER — TELEPHONE (OUTPATIENT)
Dept: FAMILY MEDICINE CLINIC | Age: 48
End: 2023-05-31

## 2023-06-01 ENCOUNTER — OFFICE VISIT (OUTPATIENT)
Dept: FAMILY MEDICINE CLINIC | Age: 48
End: 2023-06-01
Payer: COMMERCIAL

## 2023-06-01 VITALS
SYSTOLIC BLOOD PRESSURE: 138 MMHG | HEART RATE: 79 BPM | WEIGHT: 173.4 LBS | BODY MASS INDEX: 30.72 KG/M2 | OXYGEN SATURATION: 98 % | DIASTOLIC BLOOD PRESSURE: 86 MMHG | RESPIRATION RATE: 16 BRPM | HEIGHT: 63 IN

## 2023-06-01 DIAGNOSIS — F32.A ANXIETY AND DEPRESSION: ICD-10-CM

## 2023-06-01 DIAGNOSIS — F41.9 ANXIETY AND DEPRESSION: ICD-10-CM

## 2023-06-01 PROCEDURE — 99214 OFFICE O/P EST MOD 30 MIN: CPT

## 2023-06-01 RX ORDER — ARIPIPRAZOLE 5 MG/1
5 TABLET ORAL DAILY
Qty: 30 TABLET | Refills: 0 | Status: SHIPPED | OUTPATIENT
Start: 2023-06-01

## 2023-06-01 RX ORDER — BUSPIRONE HYDROCHLORIDE 15 MG/1
15 TABLET ORAL 2 TIMES DAILY
Qty: 60 TABLET | Refills: 0 | Status: SHIPPED | OUTPATIENT
Start: 2023-06-01

## 2023-06-01 SDOH — ECONOMIC STABILITY: HOUSING INSECURITY
IN THE LAST 12 MONTHS, WAS THERE A TIME WHEN YOU DID NOT HAVE A STEADY PLACE TO SLEEP OR SLEPT IN A SHELTER (INCLUDING NOW)?: NO

## 2023-06-01 SDOH — ECONOMIC STABILITY: INCOME INSECURITY: HOW HARD IS IT FOR YOU TO PAY FOR THE VERY BASICS LIKE FOOD, HOUSING, MEDICAL CARE, AND HEATING?: NOT HARD AT ALL

## 2023-06-01 SDOH — ECONOMIC STABILITY: FOOD INSECURITY: WITHIN THE PAST 12 MONTHS, THE FOOD YOU BOUGHT JUST DIDN'T LAST AND YOU DIDN'T HAVE MONEY TO GET MORE.: NEVER TRUE

## 2023-06-01 SDOH — ECONOMIC STABILITY: FOOD INSECURITY: WITHIN THE PAST 12 MONTHS, YOU WORRIED THAT YOUR FOOD WOULD RUN OUT BEFORE YOU GOT MONEY TO BUY MORE.: NEVER TRUE

## 2023-06-01 ASSESSMENT — ENCOUNTER SYMPTOMS
COLOR CHANGE: 0
WHEEZING: 0
BLOOD IN STOOL: 0
SHORTNESS OF BREATH: 0
COUGH: 0
ABDOMINAL PAIN: 0
CONSTIPATION: 0
SORE THROAT: 0
DIARRHEA: 0

## 2023-06-01 NOTE — PROGRESS NOTES
Marilyn Brown (:  1975) is a 41 y.o. female,Established patient, here for evaluation of the following chief complaint(s): Anxiety (Pt is here for a follow up of anxiety and depression, has not been able to go to work due to symptoms,tearful, feels like medication is not working enough, ) and Depression         ASSESSMENT/PLAN:  1. Anxiety and depression  -     busPIRone (BUSPAR) 15 MG tablet; Take 15 mg by mouth 2 times daily, Disp-60 tablet, R-0Normal  -     ARIPiprazole (ABILIFY) 5 MG tablet; Take 1 tablet by mouth daily, Disp-30 tablet, R-0Normal  -Patient's anxiety and depression are not well controlled. Patient is suffering from a major depressive disorder that is currently not well managed. She also does have some anxiety. Her anxiety is very situational around her current work. Patient does believe that her Effexor is helping. We will plan to continue this dose at this time however we will add Abilify to help manage patient's depressive symptoms. Spent time educating patient on side effects of Abilify. We will also plan to increase patient's buspirone at this time. Patient was again educated on side effects of medication. Patient was educated at worsening thoughts of harming self or others to please seek emergency care. - Did have discussion with patient regarding patient therapy option. Patient will continue to work with psychologist regarding these options. Patient is requesting short-term disability however psychology's last note has encouraged patient to return to work. I do believe that the patient needs to continue to work however outpatient behavioral therapy could be beneficial towards patient's overall health with correct goals in mind we will continue to work with psychology for further plan. Patient was highly encouraged to continue to see psychologist.    Return in about 4 weeks (around 2023) for Mood.          Subjective   SUBJECTIVE/OBJECTIVE:  HPI  Patient

## 2023-06-02 ENCOUNTER — FOLLOWUP TELEPHONE ENCOUNTER (OUTPATIENT)
Dept: PSYCHIATRY | Age: 48
End: 2023-06-02

## 2023-06-05 ENCOUNTER — HOSPITAL ENCOUNTER (OUTPATIENT)
Dept: PSYCHIATRY | Age: 48
Setting detail: THERAPIES SERIES
Discharge: HOME | End: 2023-06-05

## 2023-06-05 ENCOUNTER — OFFICE VISIT (OUTPATIENT)
Dept: PSYCHOLOGY | Age: 48
End: 2023-06-05
Payer: COMMERCIAL

## 2023-06-05 DIAGNOSIS — F41.9 ANXIETY DISORDER, UNSPECIFIED TYPE: ICD-10-CM

## 2023-06-05 DIAGNOSIS — F33.1 MDD (MAJOR DEPRESSIVE DISORDER), RECURRENT EPISODE, MODERATE (HCC): Primary | ICD-10-CM

## 2023-06-05 PROCEDURE — 90832 PSYTX W PT 30 MINUTES: CPT | Performed by: PSYCHOLOGIST

## 2023-06-05 ASSESSMENT — PATIENT HEALTH QUESTIONNAIRE - PHQ9
6. FEELING BAD ABOUT YOURSELF - OR THAT YOU ARE A FAILURE OR HAVE LET YOURSELF OR YOUR FAMILY DOWN: 3
3. TROUBLE FALLING OR STAYING ASLEEP: 3
SUM OF ALL RESPONSES TO PHQ QUESTIONS 1-9: 22
SUM OF ALL RESPONSES TO PHQ QUESTIONS 1-9: 22
4. FEELING TIRED OR HAVING LITTLE ENERGY: 2
10. IF YOU CHECKED OFF ANY PROBLEMS, HOW DIFFICULT HAVE THESE PROBLEMS MADE IT FOR YOU TO DO YOUR WORK, TAKE CARE OF THINGS AT HOME, OR GET ALONG WITH OTHER PEOPLE: 3
2. FEELING DOWN, DEPRESSED OR HOPELESS: 3
7. TROUBLE CONCENTRATING ON THINGS, SUCH AS READING THE NEWSPAPER OR WATCHING TELEVISION: 3
SUM OF ALL RESPONSES TO PHQ QUESTIONS 1-9: 22
SUM OF ALL RESPONSES TO PHQ QUESTIONS 1-9: 22
8. MOVING OR SPEAKING SO SLOWLY THAT OTHER PEOPLE COULD HAVE NOTICED. OR THE OPPOSITE, BEING SO FIGETY OR RESTLESS THAT YOU HAVE BEEN MOVING AROUND A LOT MORE THAN USUAL: 3
SUM OF ALL RESPONSES TO PHQ QUESTIONS 1-9: 22
SUM OF ALL RESPONSES TO PHQ9 QUESTIONS 1 & 2: 6
1. LITTLE INTEREST OR PLEASURE IN DOING THINGS: 3
5. POOR APPETITE OR OVEREATING: 2
9. THOUGHTS THAT YOU WOULD BE BETTER OFF DEAD, OR OF HURTING YOURSELF: 0

## 2023-06-05 ASSESSMENT — ANXIETY QUESTIONNAIRES
4. TROUBLE RELAXING: 3
2. NOT BEING ABLE TO STOP OR CONTROL WORRYING: 3-NEARLY EVERY DAY
GAD7 TOTAL SCORE: 20
6. BECOMING EASILY ANNOYED OR IRRITABLE: 3-NEARLY EVERY DAY
1. FEELING NERVOUS, ANXIOUS, OR ON EDGE: 3
7. FEELING AFRAID AS IF SOMETHING AWFUL MIGHT HAPPEN: 3-NEARLY EVERY DAY
3. WORRYING TOO MUCH ABOUT DIFFERENT THINGS: 3-NEARLY EVERY DAY
5. BEING SO RESTLESS THAT IT IS HARD TO SIT STILL: 2
4. TROUBLE RELAXING: 3-NEARLY EVERY DAY
IF YOU CHECKED OFF ANY PROBLEMS ON THIS QUESTIONNAIRE, HOW DIFFICULT HAVE THESE PROBLEMS MADE IT FOR YOU TO DO YOUR WORK, TAKE CARE OF THINGS AT HOME, OR GET ALONG WITH OTHER PEOPLE: EXTREMELY DIFFICULT
3. WORRYING TOO MUCH ABOUT DIFFERENT THINGS: 3
6. BECOMING EASILY ANNOYED OR IRRITABLE: 3
7. FEELING AFRAID AS IF SOMETHING AWFUL MIGHT HAPPEN: 3
GAD7 TOTAL SCORE: 20
2. NOT BEING ABLE TO STOP OR CONTROL WORRYING: 3
1. FEELING NERVOUS, ANXIOUS, OR ON EDGE: 3
5. BEING SO RESTLESS THAT IT IS HARD TO SIT STILL: 2-OVER HALF THE DAYS

## 2023-06-05 ASSESSMENT — SLEEP AND FATIGUE QUESTIONNAIRES
AVERAGE NUMBER OF SLEEP HOURS: 5
SLEEP PATTERN: DIFFICULTY FALLING ASLEEP;DISTURBED/INTERRUPTED SLEEP;RESTLESSNESS
DO YOU HAVE DIFFICULTY SLEEPING: YES
DO YOU USE A SLEEP AID: NO
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
## 2023-06-05 NOTE — PATIENT INSTRUCTIONS
Return to see Dr. Fuad Henriquez in the future, after graduating from [facility].

## 2023-06-05 NOTE — PROGRESS NOTES
Behavioral Health Consultation  Daniel Avila, Ph.D.  Psychologist  6/5/2023  4:04 PM EDT      Time spent with Patient: 20 minutes  This is patient's third  [facility] appointment. Reason for Consult:    Chief Complaint   Patient presents with    Depression    Anxiety     Referring Provider: TWIN Merritt - CNP  [address]    Feedback given to PCP. S:  Patient had intake today for [facility]. Starts program next week. Is anxious about it, but looks forward to potential for benefit. She noted that she feels anxious/paranoid about how others are treating her at work. Feels overwhelmed by changes at work, worries that her leave of absence is affecting her position. Admits to engaging in avoidant coping. Worries how her leave of absence from work is impacting her daughter. Provided support and encouragement, will f/u after graduation from [facility].      O:  MSE:    Appearance: good hygiene   Attitude: cooperative and friendly  Consciousness: alert  Orientation: oriented to person, place, time, general circumstance  Memory: recent and remote memory intact  Attention/Concentration: intact during session  Psychomotor Activity:normal  Eye Contact: normal  Speech: normal rate and volume, well-articulated  Mood: anxious  Affect: anxious  Perception: within normal limits  Thought Content: all-or-none thinking and intrusive thoughts  Thought Process: logical, coherent and goal-directed  Insight: good  Judgment: intact  Ability to understand instructions: Yes  Ability to respond meaningfully: Yes  Morbid Ideation: no   Suicide Assessment: no suicidal ideation, plan, or intent  Homicidal Ideation: no    History:    Medications:   Current Outpatient Medications   Medication Sig Dispense Refill    busPIRone (BUSPAR) 15 MG tablet Take 15 mg by mouth 2 times daily 60 tablet 0    ARIPiprazole (ABILIFY) 5 MG tablet Take 1 tablet by mouth daily 30 tablet 0    venlafaxine (EFFEXOR XR) 37.5 MG extended

## 2023-06-06 ENCOUNTER — TELEPHONE (OUTPATIENT)
Dept: FAMILY MEDICINE CLINIC | Age: 48
End: 2023-06-06

## 2023-06-12 PROBLEM — F10.10 ALCOHOL ABUSE, EPISODIC DRINKING BEHAVIOR: Status: ACTIVE | Noted: 2023-06-12

## 2023-06-12 PROBLEM — F41.1 GAD (GENERALIZED ANXIETY DISORDER): Status: ACTIVE | Noted: 2023-06-12

## 2023-06-12 PROBLEM — F43.10 PTSD (POST-TRAUMATIC STRESS DISORDER): Status: ACTIVE | Noted: 2023-06-12

## 2023-06-12 PROBLEM — F33.1 MODERATE EPISODE OF RECURRENT MAJOR DEPRESSIVE DISORDER (HCC): Status: ACTIVE | Noted: 2023-06-12

## 2023-06-19 ENCOUNTER — HOSPITAL ENCOUNTER (OUTPATIENT)
Dept: PSYCHIATRY | Age: 48
Setting detail: THERAPIES SERIES
Discharge: HOME | End: 2023-06-19
Payer: COMMERCIAL

## 2023-06-19 DIAGNOSIS — F33.1 MODERATE EPISODE OF RECURRENT MAJOR DEPRESSIVE DISORDER (HCC): Primary | ICD-10-CM

## 2023-06-19 DIAGNOSIS — F43.10 PTSD (POST-TRAUMATIC STRESS DISORDER): ICD-10-CM

## 2023-06-19 PROCEDURE — 90853 GROUP PSYCHOTHERAPY: CPT

## 2023-06-19 NOTE — GROUP NOTE
Group Therapy Note    Date: 6/19/2023    Group Start Time:  8:30 AM  Group End Time:  9:45 AM  Group Topic: Psychotherapy    MASOUDZ HOLLI Loya        Group Therapy Note    Group members will use structure of group psychotherapy to explore thoughts,feelings,behaviors and their impacts on self, relationships, and engagement with environment. Attendees: 9       Notes:  During session introduction, Bin Zaldivar reported that she is in IOP to manage her depression and anxiety challenging her avoidance tendencies. She stated that there are several things contributing to her change in attitude including her medications and the ability to process vulnerability in a supportive environment. She reflected on her challenges with critical thinking without emotional interference. Status After Intervention:  Improved    Participation Level:  Active Listener and Interactive    Participation Quality: Appropriate and Attentive      Speech:  normal      Thought Process/Content: Logical      Affective Functioning: Congruent      Mood: depressed      Level of consciousness:  Alert and Oriented x4      Response to Learning: Capable of insight and Progressing to goal      Endings: None Reported    Modes of Intervention: Support, Socialization, Exploration, and Clarifying      Discipline Responsible: Psychoeducational Specialist      Signature:  Pravin Lopez MM, MT-BC

## 2023-06-19 NOTE — GROUP NOTE
Group Therapy Note    Date: 6/19/2023    Group Start Time: 11:15 AM  Group End Time: 12:20 PM  Group Topic: Art Therapy     [facility]    Herlinda Tiwari, ATR        Group Therapy Note    Attendees: 9    Group art therapy session is a continuation of last week's art therapy group, focusing on each group member creating support tokens for their peers. Group members were encouraged to consider giving gifts to their peers that were a reminder of what is needed in their lives to maintain a healthy living routine and self care practice. Group members exchanged tokens and shared their thought and feelings on the gifts given and received. Focus was placed on objects of termination, as today and Wednesday are last program days for some group members. Notes: Coy Riley displayed positive investment in the art therapy directive. She was able to give/receive support tokens for each member of the group and for self. She was insightful and responsive to comments shared by group members and offered support to the group member finishing program today and in their final group. Status After Intervention:  Improved    Participation Level:  Active Listener and Interactive    Participation Quality: Appropriate, Attentive, and Supportive      Speech:  normal      Thought Process/Content: Logical  Linear      Affective Functioning: Constricted/Restricted      Mood: depressed (decreased)      Level of consciousness:  Alert, Oriented x4, and Attentive      Response to Learning: Able to verbalize current knowledge/experience, Able to verbalize/acknowledge new learning, Able to retain information, and Progressing to goal      Endings: None Reported    Modes of Intervention: Support, Exploration, Clarifying, Problem-solving, and Activity      Discipline Responsible: Psychoeducational Specialist      Signature:  Tomás Sneed MS, ATR-BC

## 2023-06-19 NOTE — GROUP NOTE
Group Therapy Note    Date: 6/19/2023    Group Start Time: 10:00 AM  Group End Time: 11:00 AM  Group Topic: Psychoeducation    [facility]    THOMAS Beyer        Group Therapy Note  Patient's defined mindfulness as a group: \"awareness of you physical being in the present and being self-aware of wants versus needs. \" Some of the members discussed being future minded while using the term mindfulness with the intention of getting what they want out of relationships. Members discussed feeling of vulnerability, being triggered and feeling the feelings in the moment and how they tend to avoid the feelings. Members were able to process controlling your responses and not trying to control other's to get your wants fulfilled. A group member's quote: \" I can  not control how others effect me, but I can control how I respond and effect them. \"      Attendees: 9       Patient's Goal:      Notes:  Patient was cooperative and fully engaged in the group discussion and activity. Patient helped define mindfulness, participated in grounding techniques and came to the conclusion that what we do or don't do, effects others and what they do effects us. Status After Intervention:  Improved    Participation Level:  Active Listener and Interactive    Participation Quality: Appropriate, Attentive, Sharing, and Supportive      Speech:  normal      Thought Process/Content: Logical      Affective Functioning: Congruent      Mood: anxious and fearful      Level of consciousness:  Oriented x4      Response to Learning: Able to verbalize/acknowledge new learning      Endings: None Reported    Modes of Intervention: Education, Support, Exploration, Clarifying, and Activity      Discipline Responsible: /Counselor      Signature:  THOMAS Beyer

## 2023-06-20 NOTE — BH NOTE
Outpatient Treatment Team Progress Note  Date of Review: 6/20/2023      Multidisciplinary Outpatient Treatment Team met to discuss and review patient's progress in group and individual therapy sessions. Presenting Problem: MDD, recurrent moderate nonpsychotic    Patient's Current Treatment Status: Pt continues to work on emotional awareness and regulation, mood stabilization, and coping skills.     Treatment Start Date: 6/12/2023    Treatment Step-Down Date: n/a    Tentative Discharge Date: 7/21/2023

## 2023-06-21 ENCOUNTER — HOSPITAL ENCOUNTER (OUTPATIENT)
Dept: PSYCHIATRY | Age: 48
Setting detail: THERAPIES SERIES
Discharge: HOME | End: 2023-06-21
Payer: COMMERCIAL

## 2023-06-21 DIAGNOSIS — F33.1 MODERATE EPISODE OF RECURRENT MAJOR DEPRESSIVE DISORDER (HCC): Primary | ICD-10-CM

## 2023-06-21 PROCEDURE — 90853 GROUP PSYCHOTHERAPY: CPT

## 2023-06-21 NOTE — GROUP NOTE
Group Therapy Note    Date: 6/21/2023    Group Start Time: 10:00 AM  Group End Time: 11:00 AM  Group Topic: Psychoeducation    [facility]    Chiqui Bergeron        Group Therapy Note    Attendees: 8    Facilitator led a psychoeducation group focused on interpersonal skills, effective communication, and team building. Group members were divided into two teams and given an \"escape room challenge\". Each team had five tasks/problems they had to solve within a 30 minute time period to win the \"escape room\". The tasks/problems were focused on coping and stress management. After 30 minutes, teams discussed the activity. Patients explored the role that individual strengths and interpersonal skills played in working through the tasks successfully. Notes:  Letty Lima was engaged in activity and participated well with others. She demonstrated effective and assertive communication skills. During discussion, she identified that having confidence in abilities and expressing wants, needs, and ideas assertively contributed to the success of the group as a whole. Status After Intervention:  Improved    Participation Level:  Active Listener and Interactive    Participation Quality: Appropriate, Attentive, and Sharing      Speech:  normal      Thought Process/Content: Logical  Linear      Affective Functioning: Congruent      Mood: euthymic      Level of consciousness:  Alert, Oriented x4, and Attentive      Response to Learning: Able to verbalize current knowledge/experience, Able to verbalize/acknowledge new learning, Able to retain information, Capable of insight, Able to change behavior, and Progressing to goal      Endings: None Reported    Modes of Intervention: Education, Socialization, and Activity      Discipline Responsible: /Counselor      Signature:  MARINA Covarrubias

## 2023-06-21 NOTE — GROUP NOTE
Group Therapy Note    Date: 6/21/2023    Group Start Time: 11:15 AM  Group End Time: 12:15 PM  Group Topic: Recreational    [facility]    Suleman Dailyx      Group Therapy Note    Mode of Intervention: Psychodrama    Group members engaged  in collaborative exercises, utilizing psychodrama in mirroring to facilitate process of empathy in interpersonal relationships. Attendees: 8       Notes:  Present and actively engaged across procedures and interventions. Active participant in process of empathy, presence and methods of communicating support in interpersonal relationships. No needs verbalized at conclusion of session. Status After Intervention:  Improved    Participation Level:  Active Listener and Interactive    Participation Quality: Appropriate, Sharing, and Supportive      Speech:  normal      Thought Process/Content: Logical      Affective Functioning: Congruent      Mood: euthymic      Level of consciousness:  Alert and Attentive      Response to Learning: Able to verbalize current knowledge/experience, Able to verbalize/acknowledge new learning, and Progressing to goal      Endings: None Reported    Modes of Intervention: Support, Socialization, Exploration, Activity, and Media      Discipline Responsible: Psychoeducational Specialist      Signature:  Leopoldo Kinnier, MM, MT-BC

## 2023-06-21 NOTE — GROUP NOTE
Group Therapy Note    Date: 6/21/2023    Group Start Time:  8:30 AM  Group End Time:  9:45 AM  Group Topic: Psychotherapy    [facility]    THOMAS Becerra        Group Therapy Note  Group members engaged in psychotherapy agenda group. Group members identified their individual agendas/goals and utilized the duration of group time to process, give, and receive feedback related to progress of goals. Focus was placed on remaining in the here and now in order to apply feedback learned in group to actual life circumstances and situations. Attendees: 8       Patient's Goal:  Patient shared \"that she struggled with not going to see her father in the nursing home for Father's day. \"    Notes:  Patient shared her feeling of guilt about her father and the feelings of instability in all of her relationships. Patient discussed rekindling a relationship with an ex to obtain the instant gratification and the need to be needed. Patient used the word co-dependency stating that she thrives off of the instant love bomb, becomes bored easily but hangs on for feelings of fear of being alone. She stated: \"I need to find balance of being okay being alone and having a person in my life. \" Patient listened to feedback from the group members. Status After Intervention:  Improved    Participation Level:  Active Listener and Interactive    Participation Quality: Appropriate, Attentive, Sharing, and Supportive      Speech:  pressured      Thought Process/Content: Linear      Affective Functioning: Exaggerated      Mood: elevated      Level of consciousness:  Preoccupied      Response to Learning: Capable of insight      Endings: None Reported    Modes of Intervention: Support, Exploration, Clarifying, and Reality-testing      Discipline Responsible: /Counselor      Signature:  THOMAS Becerra

## 2023-06-22 ENCOUNTER — TELEPHONE (OUTPATIENT)
Dept: FAMILY MEDICINE CLINIC | Age: 48
End: 2023-06-22

## 2023-06-23 ENCOUNTER — HOSPITAL ENCOUNTER (OUTPATIENT)
Dept: PSYCHIATRY | Age: 48
Setting detail: THERAPIES SERIES
Discharge: HOME | End: 2023-06-23
Payer: COMMERCIAL

## 2023-06-23 DIAGNOSIS — F33.1 MODERATE EPISODE OF RECURRENT MAJOR DEPRESSIVE DISORDER (HCC): Primary | ICD-10-CM

## 2023-06-23 PROCEDURE — 90853 GROUP PSYCHOTHERAPY: CPT

## 2023-06-23 NOTE — GROUP NOTE
Group Therapy Note    Date: 6/23/2023    Group Start Time: 10:00 AM  Group End Time: 11:00 AM  Group Topic: Psychoeducation    [facility]    SAGRARIO Ny        Group Therapy Note    Attendees: 8       Patient's Goal:  to learn and discuss the importance of self care and the importance it has on our mental health. Also discussed how to develop structure and routines in our lives. Pt's asked to apply to themselves. Notes:  pt actively participated in the group discussion and was able to apply to herself. Pt reported that she has a goal of starting to go to the gym to exercise and she may ask a friend to go with her to help with motivation. Status After Intervention:  Improved    Participation Level:  Active Listener and Interactive    Participation Quality: Appropriate, Attentive, and Sharing      Speech:  normal      Thought Process/Content: Logical      Affective Functioning: Congruent      Mood: euthymic      Level of consciousness:  Alert, Oriented x4, and Attentive      Response to Learning: Able to verbalize current knowledge/experience, Able to verbalize/acknowledge new learning, and Progressing to goal      Endings: None Reported    Modes of Intervention: Education, Support, Socialization, Exploration, and Clarifying      Discipline Responsible: /Counselor      Signature:  SAGRARIO Meza

## 2023-06-23 NOTE — GROUP NOTE
Group Therapy Note    Date: 6/23/2023    Group Start Time:  8:30 AM  Group End Time:  9:45 AM  Group Topic: Psychotherapy    MASOUDZ HOLLI Loya        Group Therapy Note    Group members used structure of group psychotherapy to explore thoughts, feelings, behaviors and their impact on self, relationships, and engagement with environment. Attendees: 8       Notes:  Osorio Miranda reports that she was unable to sleep til 2:30 this morning. Reports that she was very energetic, but has been challenged to even function, has been laying in bed all day most days. She shared with peers her desire to learn how to properly grieve the changes in her life. Status After Intervention:  Improved    Participation Level:  Active Listener and Interactive    Participation Quality: Attentive, Sharing, and Supportive      Speech:  normal      Thought Process/Content: Logical      Affective Functioning: Congruent      Mood: depressed      Level of consciousness:  Alert      Response to Learning: Capable of insight and Progressing to goal      Endings: None Reported    Modes of Intervention: Education, Support, Socialization, Exploration, Clarifying, and Problem-solving      Discipline Responsible: Psychoeducational Specialist      Signature:  Ash Beard MM, MT-BC

## 2023-06-23 NOTE — GROUP NOTE
Group Therapy Note    Date: 6/23/2023    Group Start Time: 11:15 AM  Group End Time: 12:15 PM  Group Topic: Psychoeducation    [facility]    Suleman Loya        Group Therapy Note    Mode of Intervention: Creative Art, Verbal Process    Topic: Draw the Line    Group facilitator provided instruction \"Draw 3 lines on 3 sheets of paper. \" Group members then swapped their papers, were given instruction \"Make something. \" Group members were given 30 minutes to creat something from the pages they were given. Group then processed their earlier conversations of baseline and \"making something out of what feels like nothing. \"    Attendees: 7       Notes:  Present and actively engaged across completion of intervention and discussions. Verbalized understanding of all information relayed. No needs verbalized at conclusion of session. Status After Intervention:  Improved    Participation Level:  Active Listener and Interactive    Participation Quality: Appropriate, Sharing, and Supportive      Speech:  normal      Thought Process/Content: Logical      Affective Functioning: Congruent      Mood: euthymic      Level of consciousness:  Alert and Attentive      Response to Learning: Capable of insight and Progressing to goal      Endings: None Reported    Modes of Intervention: Support, Socialization, Exploration, Activity, and Media      Discipline Responsible: Psychoeducational Specialist      Signature:  Karen Rosado, MM, MT-BC

## 2023-06-26 ENCOUNTER — HOSPITAL ENCOUNTER (OUTPATIENT)
Dept: PSYCHIATRY | Age: 48
Setting detail: THERAPIES SERIES
Discharge: HOME | End: 2023-06-26
Payer: COMMERCIAL

## 2023-06-28 ENCOUNTER — HOSPITAL ENCOUNTER (OUTPATIENT)
Dept: PSYCHIATRY | Age: 48
Setting detail: THERAPIES SERIES
Discharge: HOME | End: 2023-06-28
Payer: COMMERCIAL

## 2023-06-28 PROCEDURE — 90853 GROUP PSYCHOTHERAPY: CPT

## 2023-06-30 ENCOUNTER — OFFICE VISIT (OUTPATIENT)
Dept: FAMILY MEDICINE CLINIC | Age: 48
End: 2023-06-30

## 2023-06-30 ENCOUNTER — HOSPITAL ENCOUNTER (OUTPATIENT)
Dept: PSYCHIATRY | Age: 48
Setting detail: THERAPIES SERIES
Discharge: HOME | End: 2023-06-30
Payer: COMMERCIAL

## 2023-06-30 VITALS
OXYGEN SATURATION: 98 % | HEIGHT: 63 IN | WEIGHT: 177.8 LBS | SYSTOLIC BLOOD PRESSURE: 116 MMHG | DIASTOLIC BLOOD PRESSURE: 74 MMHG | HEART RATE: 100 BPM | BODY MASS INDEX: 31.5 KG/M2

## 2023-06-30 DIAGNOSIS — Z12.11 COLON CANCER SCREENING: Primary | ICD-10-CM

## 2023-06-30 DIAGNOSIS — F41.9 ANXIETY AND DEPRESSION: ICD-10-CM

## 2023-06-30 DIAGNOSIS — F32.A ANXIETY AND DEPRESSION: ICD-10-CM

## 2023-06-30 DIAGNOSIS — Z12.31 BREAST CANCER SCREENING BY MAMMOGRAM: ICD-10-CM

## 2023-06-30 DIAGNOSIS — F33.1 MDD (MAJOR DEPRESSIVE DISORDER), RECURRENT EPISODE, MODERATE (HCC): Primary | ICD-10-CM

## 2023-06-30 PROCEDURE — 90853 GROUP PSYCHOTHERAPY: CPT

## 2023-06-30 RX ORDER — VENLAFAXINE HYDROCHLORIDE 75 MG/1
75 CAPSULE, EXTENDED RELEASE ORAL DAILY
Qty: 30 CAPSULE | Refills: 0 | Status: SHIPPED | OUTPATIENT
Start: 2023-06-30 | End: 2023-07-30

## 2023-06-30 RX ORDER — BUSPIRONE HYDROCHLORIDE 15 MG/1
15 TABLET ORAL 2 TIMES DAILY
Qty: 60 TABLET | Refills: 0 | Status: SHIPPED | OUTPATIENT
Start: 2023-06-30

## 2023-06-30 RX ORDER — ARIPIPRAZOLE 5 MG/1
5 TABLET ORAL DAILY
Qty: 30 TABLET | Refills: 0 | Status: SHIPPED | OUTPATIENT
Start: 2023-06-30

## 2023-06-30 ASSESSMENT — PATIENT HEALTH QUESTIONNAIRE - PHQ9
SUM OF ALL RESPONSES TO PHQ QUESTIONS 1-9: 2
2. FEELING DOWN, DEPRESSED OR HOPELESS: 1
SUM OF ALL RESPONSES TO PHQ QUESTIONS 1-9: 2
SUM OF ALL RESPONSES TO PHQ QUESTIONS 1-9: 2
SUM OF ALL RESPONSES TO PHQ9 QUESTIONS 1 & 2: 2
SUM OF ALL RESPONSES TO PHQ QUESTIONS 1-9: 2
1. LITTLE INTEREST OR PLEASURE IN DOING THINGS: 1

## 2023-06-30 ASSESSMENT — ENCOUNTER SYMPTOMS
DIARRHEA: 0
BLOOD IN STOOL: 0
COUGH: 0
COLOR CHANGE: 0
CONSTIPATION: 0
WHEEZING: 0
SHORTNESS OF BREATH: 0
ABDOMINAL PAIN: 0
SORE THROAT: 0

## 2023-07-03 ENCOUNTER — HOSPITAL ENCOUNTER (OUTPATIENT)
Dept: PSYCHIATRY | Age: 48
Setting detail: THERAPIES SERIES
Discharge: HOME | End: 2023-07-03
Payer: COMMERCIAL

## 2023-07-03 DIAGNOSIS — F33.1 MODERATE EPISODE OF RECURRENT MAJOR DEPRESSIVE DISORDER (HCC): Primary | ICD-10-CM

## 2023-07-03 PROCEDURE — 90853 GROUP PSYCHOTHERAPY: CPT

## 2023-07-03 NOTE — GROUP NOTE
Group Therapy Note    Date: 7/3/2023    Group Start Time:  8:30 AM  Group End Time:  9:45 AM  Group Topic: Psychotherapy    [facility]        Group Therapy Note    Group members will use structure of agenda psychotherapy to explore thoughts/feelings/behaviors and their impacts on self, relationships, and engagement with surroundings. Attendees: 7       Notes:  Yunior Ribeiro collaborated with peers in discussion of inner voice of reason, lack of response in anhedonia \"even if I know better. \" Discussed family dynamics and sharing skills with children. Collaborated in discussion of expression of wants and needs, fear of failure limiting authentic expression. Status After Intervention:  Improved    Participation Level:  Active Listener and Interactive    Participation Quality: Appropriate and Sharing      Speech:  normal      Thought Process/Content: Logical      Affective Functioning: Congruent      Mood: euthymic      Level of consciousness:  Alert and Attentive      Response to Learning: Progressing to goal      Endings: None Reported    Modes of Intervention: Education, Support, Socialization, Exploration, Clarifying, and Problem-solving      Discipline Responsible: Psychoeducational Specialist      Signature:  Vic Beasley MM, MT-BC

## 2023-07-03 NOTE — GROUP NOTE
Group Therapy Note    Date: 7/3/2023    Group Start Time: 10:00 AM  Group End Time: 11:00 AM  Group Topic: Psychoeducation    [facility]    THOMAS Lopez        Group Therapy Note    Attendees: 6    Facilitator led a group that explored acceptance. Patient discussed what acceptance is and ways to practice acceptance, and reduce stress in the process. Patients were provided with an informational handout that discussed four techniques for developing acceptance and highlighters. The handout was read aloud as a group and members were encouraged to highlight content that were pertinent to them or that they would like to discuss more in depth. After reading and highlighting the article, the group discussed the content and processed how the information could be applied to their specific situations. Notes:  Jaz Noble participated in reading the aloud and highlighting relevant material. She was fully engaged in group discussion and was willing to explore how the content applied to her situation. Jaz Noble gave meaningful and appropriate feedback to other group members, and was willing to receive feedback. Status After Intervention:  Improved    Participation Level:  Active Listener    Participation Quality: Appropriate, Attentive, and Sharing      Speech:  normal      Thought Process/Content: Logical  Linear      Affective Functioning: Congruent      Mood: euthymic      Level of consciousness:  Alert, Oriented x4, and Attentive      Response to Learning: Able to verbalize current knowledge/experience, Able to verbalize/acknowledge new learning, Able to retain information, Capable of insight, Able to change behavior, and Progressing to goal      Endings: None Reported    Modes of Intervention: Education, Support, and Exploration      Discipline Responsible: /Counselor      Signature:  THOMAS Lopez

## 2023-07-03 NOTE — GROUP NOTE
Group Therapy Note    Date: 7/3/2023    Group Start Time: 11:15 AM  Group End Time: 12:15 PM  Group Topic: Psychoeducation    [facility] [place]        Group Therapy Note    Attendees: 6    Therapist introduced an art activity to the group using paint to practice creative expression and use it as a grounding/ coping skill. Therapist showed group members an example of the art activity using acrylic paint and plastic forks as utensils to stamp on paper. The theme of the activity was Fourth of July and using the fork pattern to make fireworks. This prompted conversation about Summer and Fourth of July plans and memories. Group members were able to add whatever they wanted to their original piece, as the fireworks were a guide/suggestion to incorporate. The group processed their paintings after finishing and closed group after cleaning up. Notes:  Yuliana Boo actively listened during group introduction and fully participated in group activity. Fina independently selected which paint colors she wanted to use and completed her project incorporating the demonstrated painting technique and topic. Fina interacted/shared with peers and was engaged for duration of group. No needs expressed at conclusion. Status After Intervention:  Improved    Participation Level:  Active Listener and Interactive    Participation Quality: Appropriate, Attentive, Sharing, and Supportive      Speech:  normal      Thought Process/Content: Logical  Linear      Affective Functioning: Congruent      Mood: euthymic      Level of consciousness:  Alert and Attentive      Response to Learning: Able to verbalize current knowledge/experience, Able to verbalize/acknowledge new learning, Capable of insight, and Progressing to goal      Endings: None Reported    Modes of Intervention: Support, Socialization, and Activity      Discipline Responsible:

## 2023-07-05 ENCOUNTER — HOSPITAL ENCOUNTER (OUTPATIENT)
Dept: PSYCHIATRY | Age: 48
Setting detail: THERAPIES SERIES
Discharge: HOME | End: 2023-07-05
Payer: COMMERCIAL

## 2023-07-05 DIAGNOSIS — F33.1 MODERATE EPISODE OF RECURRENT MAJOR DEPRESSIVE DISORDER (HCC): Primary | ICD-10-CM

## 2023-07-05 PROCEDURE — 90853 GROUP PSYCHOTHERAPY: CPT

## 2023-07-05 NOTE — GROUP NOTE
Group Therapy Note    Date: 7/5/2023    Group Start Time:  8:30 AM  Group End Time:  9:45 AM  Group Topic: Psychotherapy    [facility]    THOMAS Cordova        Group Therapy Note  Group members engaged in psychotherapy agenda group. Group members identified their individual agendas/goals and utilized the duration of group time to process, give, and receive feedback related to progress of goals. Focus was placed on remaining in the here and now in order to apply feedback learned in group to actual life circumstances and situations. Attendees: 5       Patient's Goal:  Patient discussed her goal of becoming more emotionally balanced. Notes:  Patient was cooperative and fully engaged in the group. She shared her thoughts and concerns with the group members. She was able to accept their feedback. Status After Intervention:  Improved    Participation Level:  Active Listener and Interactive    Participation Quality: Appropriate, Attentive, Sharing, and Supportive      Speech:  normal      Thought Process/Content: Logical      Affective Functioning: Congruent      Mood: depressed      Level of consciousness:  Attentive      Response to Learning: Able to verbalize current knowledge/experience      Endings: None Reported    Modes of Intervention: Support and Exploration      Discipline Responsible: /Counselor      Signature:  THOMAS Cordova

## 2023-07-05 NOTE — GROUP NOTE
Group Therapy Note    Date: 7/5/2023    Group Start Time: 11:15 AM  Group End Time: 12:15 PM  Group Topic: Psychoeducation    MASOUDZ HOLLI Loya        Group Therapy Note    Topic: Guilt vs Blame; Responsibility vs Accountability    Attendees: 5       Notes: Pt present and actively engaged in discussions. Reflective while relating topic to personal life, relationships, and self-talk. No needs verbalized at conclusion of session. Status After Intervention:  Improved    Participation Level:  Active Listener and Interactive    Participation Quality: Sharing and Supportive      Speech:  normal      Thought Process/Content: Logical      Affective Functioning: Congruent      Mood: euthymic      Level of consciousness:  Alert and Oriented x4      Response to Learning: Able to verbalize current knowledge/experience, Able to verbalize/acknowledge new learning, and Progressing to goal      Endings: None Reported    Modes of Intervention: Education, Support, Socialization, Exploration, Clarifying, and Problem-solving      Discipline Responsible: Psychoeducational Specialist      Signature:  Vic Beasley, MM, MT-BC

## 2023-07-06 NOTE — BH NOTE
Outpatient Treatment Team Progress Note  Date of Review: 7/6/23      Multidisciplinary Outpatient Treatment Team met to discuss and review patient's progress in group and individual therapy sessions. Presenting Problem: MDD, recurrent moderate    Patient's Current Treatment Status: Pt continues to work on emotional balance and regulation, mood stabilization, and coping with grief.      Treatment Start Date: 6/12/23    Treatment Step-Down Date: n/a    Tentative Discharge Date: 7/21/23

## 2023-07-07 ENCOUNTER — HOSPITAL ENCOUNTER (OUTPATIENT)
Dept: PSYCHIATRY | Age: 48
Setting detail: THERAPIES SERIES
Discharge: HOME | End: 2023-07-07
Payer: COMMERCIAL

## 2023-07-07 DIAGNOSIS — F33.1 MODERATE EPISODE OF RECURRENT MAJOR DEPRESSIVE DISORDER (HCC): Primary | ICD-10-CM

## 2023-07-07 PROCEDURE — 90853 GROUP PSYCHOTHERAPY: CPT

## 2023-07-07 NOTE — GROUP NOTE
Group Therapy Note    Date: 7/7/2023    Group Start Time: 11:15 AM  Group End Time: 12:15 PM  Group Topic: Cognitive Skills    [facility] [place]  [address]        Group Therapy Note    Attendees: 6    Facilitator began group with sharing/processing art work that group members painted earlier in the week. After, therapist led a discussion on \"social battery\" and identifying how different people feel recharged in a social sense. Multiple group members identified being more introverted, so group processed how being introverted/extroverted is a spectrum and humans are innately social beings. Group members identified ways to work socialization into their lives. After, group played cards (\"Deepak\") as a modality to practice social skills, communication, and cognitive skills (strategy, sequencing, following directions, memory/reminiscence). Group discussed how games are a coping skill and source of leisure and stress relief. During the game, different cards had questions associated with the card to prompt socialization between group members. This fostered purposeful conversation and allowed group members to familiarize themselves with one another, contributing to group cohesion. Group processed at the end after the game was over. Notes:  Rachid Goetz actively listened and engaged in group discussion amongst peers. Rachid Goetz fully participated in game and was alert and present for duration of group. Rachid Goetz demonstrated reciprocal conversation skills with fellow group members and no needs were expressed at conclusion of group. Status After Intervention:  Improved    Participation Level:  Active Listener and Interactive    Participation Quality: Appropriate, Attentive, and Sharing      Speech:  normal      Thought Process/Content: Logical  Linear      Affective Functioning: Congruent      Mood: euthymic      Level of consciousness:  Alert,

## 2023-07-07 NOTE — GROUP NOTE
Group Therapy Note    Date: 7/7/2023    Group Start Time: 10:00 AM  Group End Time: 11:00 AM  Group Topic: Psychoeducation    [facility]    THOMAS Nielson        Group Therapy Note    Attendees: 7    Facilitator led a group on the CBT triangle. Patients explored the model and the three stages of the CB triangle. Patients discussed the concept and then explored triangles they have experienced in their own life. Patient were introduced to methods for interrupting/changing the triangle, including cognitive restructuring, emotional regulation, and behavioral modification. Notes: Chuck Marshall was engaged in group discussion and accepted informational handout. She expressed understanding of the model and endorsed that the information was helpful. Status After Intervention:  Improved    Participation Level:  Active Listener and Interactive    Participation Quality: Appropriate and Attentive      Speech:  normal      Thought Process/Content: Logical  Linear      Affective Functioning: Congruent      Mood: euthymic      Level of consciousness:  Alert, Oriented x4, and Attentive      Response to Learning: Able to verbalize current knowledge/experience, Able to verbalize/acknowledge new learning, Able to retain information, Capable of insight, Able to change behavior, and Progressing to goal      Endings: None Reported    Modes of Intervention: Education and Exploration      Discipline Responsible: /Counselor    Signature:  MARINA Nielson

## 2023-07-07 NOTE — GROUP NOTE
Group Therapy Note    Date: 7/7/2023    Group Start Time:  8:30 AM  Group End Time:  9:45 AM  Group Topic: Psychotherapy    [facility]    THOMAS Avalos        Group Therapy Note  Group members engaged in psychotherapy agenda group. Group members identified their individual agendas/goals and utilized the duration of group time to process, give, and receive feedback related to progress of goals. Focus was placed on remaining in the here and now in order to apply feedback learned in group to actual life circumstances and situations. Attendees: 7       Patient's Goal:  \"Patient's goal is to make changes she needs to make for herself a priority. Not to keep worrying about how other's will respond to her making changes to help strengthen her mental health. \"    Notes:  Patient was able to verbalize her goal and process it with the group. Group members were able to give her feedback. Patient was able to accept the feedback. Status After Intervention:  Improved    Participation Level:  Active Listener and Interactive    Participation Quality: Appropriate, Attentive, Sharing, and Supportive      Speech:  normal      Thought Process/Content: Linear      Affective Functioning: Congruent      Mood: anxious and fearful      Level of consciousness:  Attentive      Response to Learning: Able to verbalize current knowledge/experience      Endings: None Reported    Modes of Intervention: Support, Exploration, and Clarifying      Discipline Responsible: /Counselor      Signature:  THOMAS Avalos

## 2023-07-10 ENCOUNTER — HOSPITAL ENCOUNTER (OUTPATIENT)
Dept: PSYCHIATRY | Age: 48
Setting detail: THERAPIES SERIES
Discharge: HOME | End: 2023-07-10
Payer: COMMERCIAL

## 2023-07-10 DIAGNOSIS — F33.1 MODERATE EPISODE OF RECURRENT MAJOR DEPRESSIVE DISORDER (HCC): Primary | ICD-10-CM

## 2023-07-10 PROCEDURE — 90853 GROUP PSYCHOTHERAPY: CPT

## 2023-07-10 NOTE — GROUP NOTE
Group Therapy Note    Date: 7/10/2023    Group Start Time: 11:15 AM  Group End Time: 12:15 PM  Group Topic: Psychoeducation    [facility] [place] [facility] [place]  [address]        Group Therapy Note    Attendees: 6    Facilitator led a psychoeducation group centered on developing smart goals. Patients explored what a smart goal is and why they are important. Patients broke down what makes a goal, SMART, and then practiced writing their own goals. The group was encouraged to give feedback to one another and revise their goals as needed. Informational handouts were provided. Notes: Nesha Slater successfully wrote a smart goal and was engaged in the group discussion. She was also willing to give and receive feedback. Status After Intervention:  Improved    Participation Level:  Active Listener and Interactive    Participation Quality: Appropriate, Attentive, and Sharing      Speech:  normal      Thought Process/Content: Logical  Linear      Affective Functioning: Congruent      Mood: euthymic      Level of consciousness:  Alert, Oriented x4, and Attentive      Response to Learning: Able to verbalize current knowledge/experience, Able to verbalize/acknowledge new learning, Able to retain information, Capable of insight, Able to change behavior, and Progressing to goal      Endings: None Reported    Modes of Intervention: Education      Discipline Responsible: /Counselor      Signature:  THOMAS Crabtree

## 2023-07-10 NOTE — GROUP NOTE
Group Therapy Note    Date: 7/10/2023    Group Start Time:  8:30 AM  Group End Time:  9:45 AM  Group Topic: Psychotherapy    [facility]        Group Therapy Note    Group members used structure of agenda psychotherapy to explore thoughts, feelings, behaviors, and their impacts on self/relationships/engagement with surroundings. Attendees: 8       Notes:  Mora Rivera collaborated in discussions with peers. Reports that she ended her relationship with significant other on weekend. Reported positive improvement in communication and self-expression during break-up exchange. No needs verbalized. Met goal for group. Status After Intervention:  Improved    Participation Level:  Active Listener and Interactive    Participation Quality: Sharing and Supportive      Speech:  normal      Thought Process/Content: Logical      Affective Functioning: Congruent      Mood: euthymic      Level of consciousness:  Alert and Oriented x4      Response to Learning: Progressing to goal      Endings: None Reported    Modes of Intervention: Support, Socialization, Exploration, Clarifying, and Problem-solving      Discipline Responsible: Psychoeducational Specialist      Signature:  Enzo Levine MM, MT-BC

## 2023-07-10 NOTE — GROUP NOTE
Group Therapy Note    Date: 7/10/2023    Group Start Time: 10:00 AM  Group End Time: 11:00 AM  Group Topic: Psychoeducation    [facility]    THOMAS Burr        Group Therapy Note  Clinician introduced the group members to the 5 stages of change and how they are stuck between the contemplation stage and the planning stages. Patients defined the word motivation as desire to change and the word intention as the planning. They were able to process how to motivate themselves to change using the 4 intention questions. Once the members had an idea of what they wanted to change, the clinician let them know that there will  be a group next on Smart Goals to move them into the planning stage of change. Attendees: 8       Patient's Goal:      Notes:  Patient was cooperative and fully engaged in the group discussion and activity. Patient participated in defining motivation, intention and narrowing down the change she would like to make. Patient was able to accept feedback from other members and provide feedback to others. Status After Intervention:  Improved    Participation Level:  Active Listener and Interactive    Participation Quality: Appropriate, Attentive, Sharing, and Supportive      Speech:  normal      Thought Process/Content: Logical      Affective Functioning: Congruent      Mood: anxious and fearful      Level of consciousness:  Attentive      Response to Learning: Able to retain information      Endings: None Reported    Modes of Intervention: Education, Support, Exploration, and Activity      Discipline Responsible: /Counselor      Signature:  THOMAS Burr

## 2023-07-11 NOTE — BH NOTE
Outpatient Treatment Team Progress Note  Date of Review: 7/11/2023      Multidisciplinary Outpatient Treatment Team met to discuss and review patient's progress in group and individual therapy sessions. Presenting Problem: MDD, recurrent moderate    Patient's Current Treatment Status: Pt continues to work on self-identity, coping with grief, and self-advocacy.      Treatment Start Date: 6/12/23    Treatment Step-Down Date: n/a    Tentative Discharge Date: 7/21/23

## 2023-07-12 ENCOUNTER — HOSPITAL ENCOUNTER (OUTPATIENT)
Dept: PSYCHIATRY | Age: 48
Setting detail: THERAPIES SERIES
Discharge: HOME | End: 2023-07-12
Payer: COMMERCIAL

## 2023-07-12 DIAGNOSIS — F33.1 MODERATE EPISODE OF RECURRENT MAJOR DEPRESSIVE DISORDER (HCC): Primary | ICD-10-CM

## 2023-07-12 PROCEDURE — 90853 GROUP PSYCHOTHERAPY: CPT

## 2023-07-12 RX ORDER — HYDROXYZINE HYDROCHLORIDE 25 MG/1
25 TABLET, FILM COATED ORAL EVERY 8 HOURS PRN
COMMUNITY

## 2023-07-12 NOTE — BH NOTE
Pt requested PRN medication for anxiety. Dr. Elliot Hankins approved hydroxyzine 25 mg PRN Q8 #60 no refills. Writer called in to pharmacy and notified pt.

## 2023-07-12 NOTE — GROUP NOTE
Group Therapy Note    Date: 7/12/2023    Group Start Time: 11:15 AM  Group End Time: 12:15 PM  Group Topic: Psychoeducation    [facility] [place]        Group Therapy Note    Attendees: 6    Facilitator introduced a WRAP (wellness recovery action plan) activity to group members and discussed how it is a useful tool for therapy and coping. Members discussed adjectives to describe themselves when they are feeling well and talked about actionable steps to achieve these desired feelings. Participants identified their own individual warning signs and triggers as well as ways to combat them to improve overall wellbeing. Participants completed their own individual WRAP sheet as facilitator walked them through it so that they could create their own plans of action. Members were encouraged to add to their WRAP sheet as situations arise and to refer back to it as needed. Notes:  Liliana Ge actively listened and engaged in group discussion. Liliana Wagnermatthew fully participated in Maricruz jillian activity and completed her written plan of action as a resource. Liliana Ge was present and active for duration of group. No needs were expressed at conclusion of group. Status After Intervention:  Improved    Participation Level:  Active Listener and Interactive    Participation Quality: Appropriate, Attentive, and Sharing      Speech:  normal      Thought Process/Content: Logical      Affective Functioning: Congruent      Mood: euthymic      Level of consciousness:  Alert and Attentive      Response to Learning: Able to verbalize current knowledge/experience, Able to verbalize/acknowledge new learning, Able to retain information, Capable of insight, and Progressing to goal      Endings: None Reported    Modes of Intervention: Education, Support, Exploration, Problem-solving, Activity, and Confrontation      Discipline Responsible: Psychoeducational Specialist

## 2023-07-12 NOTE — GROUP NOTE
Group Therapy Note    Date: 7/12/2023    Group Start Time: 10:00 AM  Group End Time: 11:00 AM  Group Topic: Psychoeducation    [facility] [place] [place] [facility]    MARINA Andrade        Group Therapy Note    Attendees: 6    Facilitator led a problem-solving group that encouraged group members to identify an existing or potential problem, explore goals or desired outcomes, assess for barriers or obstacles, and then brainstorm as a group to explore different problem solving approaches/methods. Notes:  Gurvinder Hansen successfully completed a problem-solving worksheet and engaged in group discussion. She was willing to give and receive feedback to other group members, and was an active participant in brainstorming. Status After Intervention:  Improved    Participation Level:  Active Listener and Interactive    Participation Quality: Appropriate, Attentive, Sharing, and Supportive      Speech:  normal      Thought Process/Content: Logical  Linear      Affective Functioning: Congruent      Mood: euthymic      Level of consciousness:  Alert, Oriented x4, and Attentive      Response to Learning: Able to verbalize current knowledge/experience, Able to verbalize/acknowledge new learning, Able to retain information, Capable of insight, Able to change behavior, and Progressing to goal      Endings: None Reported    Modes of Intervention: Support, Socialization, and Problem-solving      Discipline Responsible: /Counselor      Signature:  MARINA Andrade

## 2023-07-12 NOTE — GROUP NOTE
Group Therapy Note    Date: 7/12/2023    Group Start Time:  8:30 AM  Group End Time:  9:45 AM  Group Topic: Psychotherapy    [facility] [place]    THOMAS Sultana        Group Therapy Note  Group members engaged in psychotherapy agenda group. Group members identified their individual agendas/goals and utilized the duration of group time to process, give, and receive feedback related to progress of goals. Focus was placed on remaining in the here and now in order to apply feedback learned in group to actual life circumstances and situations. Attendees: 6       Patient's Goal:  Patient made a goal to [facility] the difficult conversation with her boss about returning to work and having autonomy in her tasks. \"    Notes:  Patient was cooperative and fully engaged in the group. She set her goal and discussed it in the group being able to accept the groups feedback. Status After Intervention:  Improved    Participation Level:  Active Listener and Interactive    Participation Quality: Appropriate, Attentive, Sharing, and Supportive      Speech:  normal      Thought Process/Content: Logical      Affective Functioning: Congruent      Mood: anxious and fearful      Level of consciousness:  Alert      Response to Learning: Able to verbalize/acknowledge new learning      Endings: None Reported    Modes of Intervention: Support and Exploration      Discipline Responsible: /Counselor      Signature:  THOMAS Sultana

## 2023-07-14 ENCOUNTER — HOSPITAL ENCOUNTER (OUTPATIENT)
Dept: PSYCHIATRY | Age: 48
Setting detail: THERAPIES SERIES
Discharge: HOME | End: 2023-07-14
Payer: COMMERCIAL

## 2023-07-14 DIAGNOSIS — F33.1 MODERATE EPISODE OF RECURRENT MAJOR DEPRESSIVE DISORDER (HCC): Primary | ICD-10-CM

## 2023-07-14 PROCEDURE — 90853 GROUP PSYCHOTHERAPY: CPT

## 2023-07-14 NOTE — GROUP NOTE
Group Therapy Note    Date: 7/14/2023    Group Start Time:  8:30 AM  Group End Time:  9:45 AM  Group Topic: Psychotherapy    [facility]    Suleman Loya        Group Therapy Note    Group members engaged in agenda psychotherapy structure to explore thoughts, feelings, behaviors, and their impacts on self/relationships/engagement with surroundings. Attendees: 7       Notes:  Ras Pugh collaborated in discussion with peers of her challenges returning to work, feeling less productive and that her performance is under scrutiny by supervisor. Set intentions to learn and practice skills for difficult conversations. Active participant with peers in discussion of termination of relationships and prioritization of self. Status After Intervention:  Improved    Participation Level:  Active Listener and Interactive    Participation Quality: Appropriate, Attentive, Sharing, and Supportive      Speech:  normal      Thought Process/Content: Logical      Affective Functioning: Congruent      Mood: euthymic      Level of consciousness:  Alert and Oriented x4      Response to Learning: Capable of insight and Progressing to goal      Endings: None Reported    Modes of Intervention: Support, Socialization, Exploration, Clarifying, and Problem-solving      Discipline Responsible: Psychoeducational Specialist      Signature:  Joelle Saravia MM, MT-BC

## 2023-07-14 NOTE — GROUP NOTE
Group Therapy Note    Date: 7/14/2023    Group Start Time: 10:00 AM  Group End Time: 11:00 AM  Group Topic: Activity    [facility]    THOMAS Carlin        Group Therapy Note  Clinician facilitated a group for closure for a member who is graduating the program today. Group members created a good bye card complete with heartfelt wishes for the member. The member who is graduating, read each card allowed while the clinician wrote each strength based word on the whiteboard. By the time the member read all of the cards the white board was full of positive qualities, strengths and values. Attendees: 8       Patient's Goal:      Notes:  Patient created a card with the crafting supplies that were available. Patient wrote a letter to the graduating member. When the graduating member read the cards out loud, patient participated in the discussion about how full the whiteboard was. Status After Intervention:  Improved    Participation Level:  Active Listener and Interactive    Participation Quality: Appropriate, Attentive, Sharing, and Supportive      Speech:  normal      Thought Process/Content: Logical      Affective Functioning: Congruent      Mood: euthymic      Level of consciousness:  Attentive      Response to Learning: Able to verbalize current knowledge/experience      Endings: None Reported    Modes of Intervention: Education, Support, Exploration, and Activity      Discipline Responsible: /Counselor      Signature:  THOMAS Carlin

## 2023-07-14 NOTE — GROUP NOTE
Group Therapy Note    Date: 7/14/2023    Group Start Time: 11:15 AM  Group End Time: 12:15 PM  Group Topic: Psychoeducation    [facility]        Group Therapy Note    Attendees: 8    Participants watched a \"[employer] Talk\" called \"The Surprising Science of Happiness,\" by Jose Cardoso. Facilitator then lead a discussion following the clip discussing the idea of cultivating happiness in their own lives. Group members discussed happiness being a decision and not a destination. For the remainder of group, participants were given the choice between 15 different images and chose the image that brought them the most happiness when looking at it. They used different mediums (colored pencil, crayon, markers, oil pastel) to color in the image they chose. Group members socialized while color and group processed using art as a grounding/ mindfulness technique as well as a coping skill. Notes:  Marionjoslyn Castillo actively listened during video and during group discussion. William Jonathan fully participated in group activity and took coloring sheet with her to finish. No needs expressed at conclusion of group. Status After Intervention:  Improved    Participation Level:  Active Listener and Interactive    Participation Quality: Appropriate and Attentive      Speech:  normal      Thought Process/Content: Logical      Affective Functioning: Congruent      Mood: euthymic      Level of consciousness:  Alert, Oriented x4, and Attentive      Response to Learning: Able to verbalize current knowledge/experience, Able to verbalize/acknowledge new learning, Capable of insight, and Progressing to goal      Endings: None Reported    Modes of Intervention: Education, Support, Socialization, Activity, and Media      Discipline Responsible: Psychoeducational Specialist and Recreational Therapist      Signature:  Carlos Schwartz MA, [place]

## 2023-07-17 ENCOUNTER — HOSPITAL ENCOUNTER (OUTPATIENT)
Dept: PSYCHIATRY | Age: 48
Setting detail: THERAPIES SERIES
Discharge: HOME | End: 2023-07-17
Payer: COMMERCIAL

## 2023-07-17 DIAGNOSIS — F33.1 MODERATE EPISODE OF RECURRENT MAJOR DEPRESSIVE DISORDER (HCC): Primary | ICD-10-CM

## 2023-07-17 PROCEDURE — 90853 GROUP PSYCHOTHERAPY: CPT

## 2023-07-17 NOTE — GROUP NOTE
Group Therapy Note    Date: 7/17/2023    Group Start Time: 11:15 AM  Group End Time: 12:15 PM  Group Topic: Psychoeducation    [facility] Minerva Holstein, MSW        Group Therapy Note  Clinician introduced the group members to the DBT wise mind, emotional mind and reasonable mind. Patients were able to identify each of them and describe scenario for each section. Attendees: 7       Patient's Goal:      Notes:  Patient was cooperative and fully engaged in the group. Patient participated in the descriptions of situations for each section. Status After Intervention:  Improved    Participation Level:  Active Listener    Participation Quality: Appropriate, Attentive, Sharing, and Supportive      Speech:  normal      Thought Process/Content: Logical      Affective Functioning: Congruent      Mood: euthymic      Level of consciousness:  Alert      Response to Learning: Able to verbalize current knowledge/experience      Endings: None Reported    Modes of Intervention: Education, Support, Exploration, and Activity      Discipline Responsible: /Counselor      Signature:  Minerva Holstein, MSW

## 2023-07-17 NOTE — GROUP NOTE
Group Therapy Note    Date: 7/17/2023    Group Start Time: 10:00 AM  Group End Time: 11:00 AM  Group Topic: Psychoeducation    [facility]    Babs Strong, [place]        Group Therapy Note    Attendees: 7    Facilitator provided a handout on \"Assertive Communication,\" as well as high lighter markers for participants to follow along and lance up their copy as they read along. Group members highlighted important information in the text and were able to learn how to be assertive communicators in order to minimize conflict and improve relationships. After, the group did an activity with role play cards where each member read a scenario aloud and then the group processed and discussed how they could confront or handle the situation while utilizing assertive communication. Notes:  Patel Gamez actively listened and engaged in group discussion. Patel Gamez was present and attentive for duration of group. Patel Gamez fully participated in group activity and shared her thoughts aloud with group members. No needs were expressed at conclusion of group. Status After Intervention:  Improved    Participation Level:  Active Listener and Interactive    Participation Quality: Appropriate, Attentive, Sharing, and Supportive      Speech:  normal      Thought Process/Content: Logical      Affective Functioning: Congruent      Mood: euthymic      Level of consciousness:  Alert and Attentive      Response to Learning: Able to verbalize current knowledge/experience, Able to verbalize/acknowledge new learning, Capable of insight, and Progressing to goal      Endings: None Reported    Modes of Intervention: Education, Support, Socialization, Clarifying, Problem-solving, Activity, and Confrontation      Discipline Responsible: Psychoeducational Specialist and Recreational Therapist      Signature:  Babs Strong MA, Mayelin Sprague

## 2023-07-17 NOTE — GROUP NOTE
Group Therapy Note    Date: 7/17/2023    Group Start Time:  8:30 AM  Group End Time:  9:45 AM  Group Topic: Psychotherapy    MASOUDZ HOLLI Loya        Group Therapy Note    Group members used structure of agenda psychotherapy to explore thoughts,feelings,behaviors and their impacts on self/relationships/surroundings. Attendees: 7       Notes:  Endorses positive benefits from medication and engagement in IOP, reported \"I feel like I'm doing what I want to do again. Things feel easier, like I don't have to try as much. \" Discussed stressors in returning to work on Tuesday/Thursday, fear of micro management from supervisor. Processed locus of control with facilitator and peers. No needs verbalized. Met goal for group. Status After Intervention:  Improved    Participation Level:  Active Listener and Interactive    Participation Quality: Attentive, Sharing, and Supportive      Speech:  normal      Thought Process/Content: Logical      Affective Functioning: Congruent      Mood: euthymic      Level of consciousness:  Alert and Attentive      Response to Learning: Progressing to goal      Endings: None Reported    Modes of Intervention: Support, Socialization, Exploration, Clarifying, and Problem-solving      Discipline Responsible: Psychoeducational Specialist      Signature:  Jhonny Sheriff MM, MT-BC

## 2023-07-18 NOTE — BH NOTE
Outpatient Treatment Team Progress Note  Date of Review: 7/18/23      Multidisciplinary Outpatient Treatment Team met to discuss and review patient's progress in group and individual therapy sessions. Presenting Problem: MDD, recurrent moderate    Patient's Current Treatment Status: Pt continues to work on discharge preparedness and self-advocacy.      Treatment Start Date: 6/12/23    Treatment Step-Down Date: n/a    Tentative Discharge Date: 7/21/23

## 2023-07-19 ENCOUNTER — HOSPITAL ENCOUNTER (OUTPATIENT)
Dept: PSYCHIATRY | Age: 48
Setting detail: THERAPIES SERIES
Discharge: HOME | End: 2023-07-19
Payer: COMMERCIAL

## 2023-07-19 DIAGNOSIS — F32.A ANXIETY AND DEPRESSION: ICD-10-CM

## 2023-07-19 DIAGNOSIS — F33.1 MODERATE EPISODE OF RECURRENT MAJOR DEPRESSIVE DISORDER (HCC): Primary | ICD-10-CM

## 2023-07-19 DIAGNOSIS — F41.9 ANXIETY AND DEPRESSION: ICD-10-CM

## 2023-07-19 PROCEDURE — 90853 GROUP PSYCHOTHERAPY: CPT

## 2023-07-19 RX ORDER — VENLAFAXINE HYDROCHLORIDE 150 MG/1
150 CAPSULE, EXTENDED RELEASE ORAL DAILY
Qty: 30 CAPSULE | Refills: 0 | Status: SHIPPED | OUTPATIENT
Start: 2023-07-19 | End: 2023-08-18

## 2023-07-19 NOTE — GROUP NOTE
Group Therapy Note    Date: 7/19/2023    Group Start Time:  8:30 AM  Group End Time:  9:45 AM  Group Topic: Psychotherapy    [facility]    THOMAS Frausto        Group Therapy Note  Group members engaged in psychotherapy agenda group. Group members identified their individual agendas/goals and utilized the duration of group time to process, give, and receive feedback related to progress of goals. Focus was placed on remaining in the here and now in order to apply feedback learned in group to actual life circumstances and situations. Attendees: 9       Patient's Goal:  \"Patients goal is to continue learning and implementing coping skills to help her manage her anxiety that has increased since returning to work. \"    Notes:  Patient was able to share their goal with the group members to help them process it. Patient was accepting of the groups feedback and able to provide feedback to other members. Status After Intervention:  Improved    Participation Level:  Active Listener and Interactive    Participation Quality: Appropriate, Attentive, and Sharing      Speech:  normal      Thought Process/Content: Logical      Affective Functioning: Congruent      Mood: anxious, depressed, and fearful      Level of consciousness:  Attentive      Response to Learning: Able to verbalize current knowledge/experience      Endings: None Reported    Modes of Intervention: Support, Exploration, and Reality-testing      Discipline Responsible: /Counselor      Signature:  THOMAS Frausto

## 2023-07-19 NOTE — GROUP NOTE
Group Therapy Note    Date: 7/19/2023    Group Start Time: 10:00 AM  Group End Time: 11:00 AM  Group Topic: Psychoeducation    [facility] THOM[facility] Mcmullen        Group Therapy Note    Attendees: 9    Facilitator led a group centered on the importance of self-compassion. Patients explored what self-compassion is and ways to extend compassion to self. After a brief discussion, patients wrote a letter of self-compassion to themselves. Patients were asked to imagine what someone who loves and accepts you unconditionally for who you are would say to them, and apply that same perspective when writing the letter to themselves. After writing, patients processed the process and explored strengths and challenges that stemmed from the activity. Patients were given an opportunity to share their letter, if desired, and were provided with envelopes to secure their letters for later reading. Notes:  Nadya Patterson was engaged in group discussion and successfully completed the writing activity. She declined to share her letter but was able to give brief reflection on the writing process and reported that it was helpful for challenge her perspective of self. Status After Intervention:  Improved    Participation Level:  Active Listener and Interactive    Participation Quality: Appropriate and Attentive      Speech:  normal      Thought Process/Content: Logical  Linear      Affective Functioning: Congruent      Mood: euthymic      Level of consciousness:  Alert, Oriented x4, and Attentive      Response to Learning: Able to verbalize current knowledge/experience, Able to verbalize/acknowledge new learning, Capable of insight, and Progressing to goal      Endings: None Reported    Modes of Intervention: Exploration and Activity      Discipline Responsible: /Counselor      Signature:  MARINA Forbes

## 2023-07-21 ENCOUNTER — HOSPITAL ENCOUNTER (OUTPATIENT)
Dept: PSYCHIATRY | Age: 48
Setting detail: THERAPIES SERIES
Discharge: HOME | End: 2023-07-21
Payer: COMMERCIAL

## 2023-07-21 VITALS
SYSTOLIC BLOOD PRESSURE: 128 MMHG | RESPIRATION RATE: 16 BRPM | TEMPERATURE: 97.8 F | DIASTOLIC BLOOD PRESSURE: 69 MMHG | HEART RATE: 92 BPM | OXYGEN SATURATION: 98 %

## 2023-07-21 DIAGNOSIS — F41.9 ANXIETY AND DEPRESSION: ICD-10-CM

## 2023-07-21 DIAGNOSIS — F33.1 MODERATE EPISODE OF RECURRENT MAJOR DEPRESSIVE DISORDER (HCC): Primary | ICD-10-CM

## 2023-07-21 DIAGNOSIS — F32.A ANXIETY AND DEPRESSION: ICD-10-CM

## 2023-07-21 PROCEDURE — 90853 GROUP PSYCHOTHERAPY: CPT

## 2023-07-21 RX ORDER — ARIPIPRAZOLE 10 MG/1
10 TABLET ORAL DAILY
Qty: 30 TABLET | Refills: 0 | Status: SHIPPED | OUTPATIENT
Start: 2023-07-21 | End: 2023-08-11

## 2023-07-21 RX ORDER — BUSPIRONE HYDROCHLORIDE 15 MG/1
15 TABLET ORAL 2 TIMES DAILY
Qty: 60 TABLET | Refills: 0 | Status: SHIPPED | OUTPATIENT
Start: 2023-07-21

## 2023-07-21 RX ORDER — ARIPIPRAZOLE 5 MG/1
10 TABLET ORAL DAILY
Qty: 30 TABLET | Refills: 0 | Status: SHIPPED | OUTPATIENT
Start: 2023-07-21 | End: 2023-07-21 | Stop reason: SDUPTHER

## 2023-07-21 NOTE — GROUP NOTE
Group Therapy Note    Date: 7/21/2023    Group Start Time: 11:15 AM  Group End Time: 12:15 PM  Group Topic: Psychoeducation    [facility]    Suleman Vincenzo        Group Therapy Note    Topic: Narrative Expression    Mode of Intervention: Creative Writing    Group facilitator facilitated discussion of stories and storytelling, opening dialogue on \"the stories that define us\". Group members explored their beliefs about storytelling, using stories to build connections. Group facilitator used Story Dice to select 6 items for use as symbols in narrative exercise. Group members were instructed to write a story incorporating these 6 items, read to peers and processed the symbolism therein. Group collaborated in process of narrative techniques of exploration while challenging pathologizing of life events and experiences. Attendees: 9       Notes: Pt present and attentive during session. Voluntarily shared creative writing with peers. Active participant in discussion of stories used to define self. Set intention to explore additional uses of storytelling to broaden externalization of experiences. No needs verbalized at conclusion of session. Status After Intervention:  Improved    Participation Level:  Active Listener and Interactive    Participation Quality: Appropriate, Attentive, Sharing, and Supportive      Speech:  normal      Thought Process/Content: Logical      Affective Functioning: Congruent      Mood: euthymic      Level of consciousness:  Alert and Attentive      Response to Learning: Capable of insight and Progressing to goal      Endings: None Reported    Modes of Intervention: Support, Socialization, Exploration, Problem-solving, Activity, and Media      Discipline Responsible: Psychoeducational Specialist      Signature:  Travis Leon MM, MT-BC

## 2023-07-21 NOTE — GROUP NOTE
Group Therapy Note    Date: 7/21/2023    Group Start Time:  8:30 AM  Group End Time:  9:45 AM  Group Topic: Psychotherapy    [facility]    THOMAS Ernst        Group Therapy Note  Group members engaged in psychotherapy agenda group. Group members identified their individual agendas/goals and utilized the duration of group time to process, give, and receive feedback related to progress of goals. Focus was placed on remaining in the here and now in order to apply feedback learned in group to actual life circumstances and situations. Attendees: 9       Patient's Goal:  \"Patient set her goal to be able to work at her job like she used to do. \" This is her ultimate goal for her future therapy. Notes:  Patient was cooperative and shared her goal with the group to get feedback. Patient was able to accept feedback and provide feedback to others. Status After Intervention:  Improved    Participation Level:  Active Listener and Interactive    Participation Quality: Appropriate, Attentive, Sharing, and Supportive      Speech:  normal      Thought Process/Content: Logical      Affective Functioning: Congruent      Mood: euthymic      Level of consciousness:  Attentive      Response to Learning: Able to verbalize/acknowledge new learning      Endings: None Reported    Modes of Intervention: Support, Exploration, and Clarifying      Discipline Responsible: /Counselor      Signature:  THOMAS Ernst

## 2023-07-21 NOTE — DISCHARGE INSTRUCTIONS
Discharge Planning is Complete. Discharge Date: 7/21/2023  Discharge Diagnosis: Major depressive episode, recurrent, nonpsychotic, moderate; Generalized anxiety disorder; PTSD    Your instructions: Your physician here was Dr. Laura Jimenez. If you have any questions please do not hesitate to call [phone] with any questions you may have. Please note that we have a patient family advisory [place] that meets the second Wednesday of January and the second Wednesday of July AT 5pm in [place] at [facility]. Department leadership would love for you to attend to give feedback on what we are doing well and areas in which we can improve our patient care. Please come if you are able and feel free to reach out to [address] [place] [address] at [phone] with any questions. The crisis number for [facility] is [address] ([address] [facility] [address] can use this number at any time to access emergency mental health services. Please follow up with your PCP regarding any pending labs. You are already connected with Dr. Karen Hernandez with [address] [place] [address] for individual counseling. Please contact them at your earliest convenience to schedule a follow up appointment. Their information is below:    Name of Provider: Dr. Karen Hernandez   Provider specialty/license: Psy. D   Date and time of appointment: Monday 7/24/2023 @ 9:00am <- Suggested call in time to schedule   The type/s of services requested are: individual counseling  Agency name: [address] [place] [address] Psychiatry  Address: [facility] [address], [address]  Phone Number: [phone]  Special instructions(what to bring to appointment, etc.): N/A     It is recommended that you contact [facility], [facility] to schedule an intake appointment for their DBT group.    Name of Provider: [address]   Provider specialty/license: DBT group online   Date and time of appointment: 7/21/2023 upon discharge   The type/s of services requested are: DBT group

## 2023-07-21 NOTE — BH NOTE
Discharge Planning is Complete. Discharge Date: 7/21/2023  Discharge Diagnosis: Major depressive episode, recurrent, nonpsychotic, moderate; Generalized anxiety disorder; PTSD    Your instructions: Your physician here was Dr. Citlalli Khalil. If you have any questions please do not hesitate to call [phone] with any questions you may have. Please note that we have a patient family advisory [place] that meets the second Wednesday of January and the second Wednesday of July AT 5pm in [place] at [facility]. Department leadership would love for you to attend to give feedback on what we are doing well and areas in which we can improve our patient care. Please come if you are able and feel free to reach out to [address] [place] [address] at [phone] with any questions. The crisis number for [facility] is 80 ([address] [facility] [address] can use this number at any time to access emergency mental health services. Please follow up with your PCP regarding any pending labs. You are already connected with Dr. Barry Jhaveri with [facility]) for individual counseling. Please contact them at your earliest convenience to schedule a follow up appointment. Their information is below:    Name of Provider: Dr. Barry Jhaveri   Provider specialty/license: Psy. D   Date and time of appointment: Monday 7/24/2023 @ 9:00am <- Suggested call in time to schedule   The type/s of services requested are: individual counseling  Agency name: [address] [place] [address] Psychiatry  Address: [facility] [address], [address]  Phone Number: [phone]  Special instructions(what to bring to appointment, etc.): N/A     It is recommended that you contact [facility] to schedule an intake appointment for their DBT group.    Name of Provider: [address]   Provider specialty/license: DBT group online   Date and time of appointment: 7/21/2023 upon discharge   The type/s of services requested are: DBT group

## 2023-07-21 NOTE — GROUP NOTE
Group Therapy Note    Date: 7/21/2023    Group Start Time: 10:00 AM  Group End Time: 11:00 AM  Group Topic: Psychoeducation    [facility]  [address], MSW        Group Therapy Note    Attendees: 9    Facilitator led a psychoeducation group centered on understanding and challenging people pleasing behavior. Patients discussed where people pleasing stems from and the purpose behind the behavior. The group explored what people please can look like, what steps can be taken to unlearn people pleasing habits, how self-compassion and assertiveness plays a role in changing behavior. Patients were given informational handouts on \"Strategies for interrupting people pleasing tendencies\" and were encouraged to review and reflect on the article throughout the weekend as homework. Patients wrote down their homework goal in their notebooks/folders and were asked to bring articles back with them next week for group discussion. Notes:  Darby Anderson was attentive to group discussion and was willing to share input on educational content. Status After Intervention:  Improved    Participation Level:  Active Listener and Interactive    Participation Quality: Appropriate and Attentive      Speech:  normal      Thought Process/Content: Logical  Linear      Affective Functioning: Congruent      Mood: euthymic      Level of consciousness:  Alert, Oriented x4, and Attentive      Response to Learning: Able to verbalize current knowledge/experience, Able to verbalize/acknowledge new learning, Able to change behavior, and Progressing to goal      Endings: None Reported    Modes of Intervention: Education and Exploration      Discipline Responsible: /Counselor      Signature:  MARINA Gerardo

## 2023-08-11 ENCOUNTER — OFFICE VISIT (OUTPATIENT)
Dept: FAMILY MEDICINE CLINIC | Age: 48
End: 2023-08-11
Payer: COMMERCIAL

## 2023-08-11 VITALS
WEIGHT: 185.4 LBS | SYSTOLIC BLOOD PRESSURE: 138 MMHG | OXYGEN SATURATION: 99 % | DIASTOLIC BLOOD PRESSURE: 88 MMHG | BODY MASS INDEX: 33.16 KG/M2 | HEART RATE: 97 BPM

## 2023-08-11 DIAGNOSIS — F32.A ANXIETY AND DEPRESSION: ICD-10-CM

## 2023-08-11 DIAGNOSIS — F41.9 ANXIETY AND DEPRESSION: ICD-10-CM

## 2023-08-11 PROCEDURE — 1036F TOBACCO NON-USER: CPT

## 2023-08-11 PROCEDURE — 99213 OFFICE O/P EST LOW 20 MIN: CPT

## 2023-08-11 PROCEDURE — G8427 DOCREV CUR MEDS BY ELIG CLIN: HCPCS

## 2023-08-11 PROCEDURE — G8417 CALC BMI ABV UP PARAM F/U: HCPCS

## 2023-08-11 RX ORDER — OXYBUTYNIN CHLORIDE 10 MG/1
10 TABLET, EXTENDED RELEASE ORAL DAILY
Qty: 30 TABLET | Refills: 3 | Status: SHIPPED | OUTPATIENT
Start: 2023-08-11

## 2023-08-11 RX ORDER — ARIPIPRAZOLE 15 MG/1
15 TABLET ORAL DAILY
Qty: 30 TABLET | Refills: 0 | Status: SHIPPED | OUTPATIENT
Start: 2023-08-11 | End: 2023-09-10

## 2023-08-11 ASSESSMENT — ENCOUNTER SYMPTOMS
DIARRHEA: 0
COUGH: 0
CONSTIPATION: 0
SORE THROAT: 0
BLOOD IN STOOL: 0
SHORTNESS OF BREATH: 0
WHEEZING: 0
COLOR CHANGE: 0
ABDOMINAL PAIN: 0

## 2023-08-11 ASSESSMENT — PATIENT HEALTH QUESTIONNAIRE - PHQ9
SUM OF ALL RESPONSES TO PHQ QUESTIONS 1-9: 19
8. MOVING OR SPEAKING SO SLOWLY THAT OTHER PEOPLE COULD HAVE NOTICED. OR THE OPPOSITE, BEING SO FIGETY OR RESTLESS THAT YOU HAVE BEEN MOVING AROUND A LOT MORE THAN USUAL: 0
SUM OF ALL RESPONSES TO PHQ QUESTIONS 1-9: 19
SUM OF ALL RESPONSES TO PHQ9 QUESTIONS 1 & 2: 6
10. IF YOU CHECKED OFF ANY PROBLEMS, HOW DIFFICULT HAVE THESE PROBLEMS MADE IT FOR YOU TO DO YOUR WORK, TAKE CARE OF THINGS AT HOME, OR GET ALONG WITH OTHER PEOPLE: 3
6. FEELING BAD ABOUT YOURSELF - OR THAT YOU ARE A FAILURE OR HAVE LET YOURSELF OR YOUR FAMILY DOWN: 3
5. POOR APPETITE OR OVEREATING: 3
7. TROUBLE CONCENTRATING ON THINGS, SUCH AS READING THE NEWSPAPER OR WATCHING TELEVISION: 1
4. FEELING TIRED OR HAVING LITTLE ENERGY: 3
SUM OF ALL RESPONSES TO PHQ QUESTIONS 1-9: 19
SUM OF ALL RESPONSES TO PHQ QUESTIONS 1-9: 19
9. THOUGHTS THAT YOU WOULD BE BETTER OFF DEAD, OR OF HURTING YOURSELF: 0
3. TROUBLE FALLING OR STAYING ASLEEP: 3
2. FEELING DOWN, DEPRESSED OR HOPELESS: 3
1. LITTLE INTEREST OR PLEASURE IN DOING THINGS: 3

## 2023-08-11 NOTE — PROGRESS NOTES
Roxanne Bro (:  1975) is a 41 y.o. female,Established patient, here for evaluation of the following chief complaint(s):  Depression         ASSESSMENT/PLAN:  1. Anxiety and depression  -     ARIPiprazole (ABILIFY) 15 MG tablet; Take 1 tablet by mouth daily, Disp-30 tablet, R-0Normal  -     [facility] - Sara Contreras, PMHNP, Psychiatry, [place]  -Patient presents to the office today with worsening depression. She was doing well when she was in daily outpatient therapy but struggled when returning back to work. Patient has a follow-up psychology appointment on Monday. Patient really would benefit from continuing and ongoing therapy/counseling. Plan at this time will be to increase the patient's Abilify to 15 mg from 10. Ultimately patient needs to establish and have consistent relationship with a psychiatrist have given patient referral and highly encouraged her to schedule appointment as soon as she can possibly get in. Patient was reminded and encouraged to keep psychology appointment to further work on coping mechanisms outside of medications. Patient was reminded on side effects of medication and when to present for worsening mood. Patient is agreeable to plan at this time. Return in about 4 weeks (around 2023) for Mood . Subjective   SUBJECTIVE/OBJECTIVE:  HPI  Patient presents the office today for follow-up regarding mood. Patient completed outpatient behavioral health was seeing behavioral health psychiatrist as she did have progress during her time there but comes to the office today with complaints of still having depression. She states that her anxiety is well-managed with buspirone however she feels that she is just depressed sleeping more and not really wanting to go to work having a hard time getting out of bed. Denies any thoughts of harming herself or others.   Patient endorses that she did start a relationship ultimately did end up breaking that relationship off

## 2023-08-14 ENCOUNTER — OFFICE VISIT (OUTPATIENT)
Dept: PSYCHOLOGY | Age: 48
End: 2023-08-14
Payer: COMMERCIAL

## 2023-08-14 DIAGNOSIS — F41.9 ANXIETY DISORDER, UNSPECIFIED TYPE: ICD-10-CM

## 2023-08-14 DIAGNOSIS — F33.1 MDD (MAJOR DEPRESSIVE DISORDER), RECURRENT EPISODE, MODERATE (HCC): Primary | ICD-10-CM

## 2023-08-14 PROCEDURE — 90832 PSYTX W PT 30 MINUTES: CPT | Performed by: PSYCHOLOGIST

## 2023-08-14 ASSESSMENT — ANXIETY QUESTIONNAIRES
1. FEELING NERVOUS, ANXIOUS, OR ON EDGE: 1
3. WORRYING TOO MUCH ABOUT DIFFERENT THINGS: 1-SEVERAL DAYS
6. BECOMING EASILY ANNOYED OR IRRITABLE: 0-NOT AT ALL
2. NOT BEING ABLE TO STOP OR CONTROL WORRYING: 1-SEVERAL DAYS
4. TROUBLE RELAXING: 1-SEVERAL DAYS
GAD7 TOTAL SCORE: 4
7. FEELING AFRAID AS IF SOMETHING AWFUL MIGHT HAPPEN: 0-NOT AT ALL
5. BEING SO RESTLESS THAT IT IS HARD TO SIT STILL: 0-NOT AT ALL

## 2023-08-14 ASSESSMENT — PATIENT HEALTH QUESTIONNAIRE - PHQ9
3. TROUBLE FALLING OR STAYING ASLEEP: 3
8. MOVING OR SPEAKING SO SLOWLY THAT OTHER PEOPLE COULD HAVE NOTICED. OR THE OPPOSITE, BEING SO FIGETY OR RESTLESS THAT YOU HAVE BEEN MOVING AROUND A LOT MORE THAN USUAL: 1
6. FEELING BAD ABOUT YOURSELF - OR THAT YOU ARE A FAILURE OR HAVE LET YOURSELF OR YOUR FAMILY DOWN: 2
SUM OF ALL RESPONSES TO PHQ QUESTIONS 1-9: 19
1. LITTLE INTEREST OR PLEASURE IN DOING THINGS: 3
SUM OF ALL RESPONSES TO PHQ9 QUESTIONS 1 & 2: 6
4. FEELING TIRED OR HAVING LITTLE ENERGY: 3
10. IF YOU CHECKED OFF ANY PROBLEMS, HOW DIFFICULT HAVE THESE PROBLEMS MADE IT FOR YOU TO DO YOUR WORK, TAKE CARE OF THINGS AT HOME, OR GET ALONG WITH OTHER PEOPLE: 2
5. POOR APPETITE OR OVEREATING: 2
9. THOUGHTS THAT YOU WOULD BE BETTER OFF DEAD, OR OF HURTING YOURSELF: 0
SUM OF ALL RESPONSES TO PHQ QUESTIONS 1-9: 19
2. FEELING DOWN, DEPRESSED OR HOPELESS: 3
7. TROUBLE CONCENTRATING ON THINGS, SUCH AS READING THE NEWSPAPER OR WATCHING TELEVISION: 2
SUM OF ALL RESPONSES TO PHQ QUESTIONS 1-9: 19
SUM OF ALL RESPONSES TO PHQ QUESTIONS 1-9: 19

## 2023-08-14 NOTE — PROGRESS NOTES
Behavioral Health Consultation  Alison Anderson, Ph.D.  Psychologist  8/14/2023  10:10 AM EDT      Time spent with Patient: 25 minutes  This is patient's fourth  [facility] appointment. Reason for Consult:    Chief Complaint   Patient presents with    Depression    Anxiety     Referring Provider: Janiya Hernandez, APRN - CNP  [address]  [facility] [address] [facility],  [address] [facility]    Feedback given to PCP. S:  Patient reported that she has been back to work in the last 2 weeks. Her anxiety has been more well-controlled since leaving [facility], but she has been noticing more depression in the last week or so. Has been missing work, is avoiding tasks at home. She has been using grounding, helps her stay focused on each day. Discussed referral to specialty care and placed referral to [facility].       O:  MSE:    Appearance: good hygiene   Attitude: cooperative and friendly  Consciousness: alert  Orientation: oriented to person, place, time, general circumstance  Memory: recent and remote memory intact  Attention/Concentration: intact during session  Psychomotor Activity:normal  Eye Contact: normal  Speech: normal rate and volume, well-articulated  Mood: depressed  Affect: dysphoric  Perception: within normal limits  Thought Content: all-or-none thinking and intrusive thoughts  Thought Process: logical, coherent and goal-directed  Insight: good  Judgment: intact  Ability to understand instructions: Yes  Ability to respond meaningfully: Yes  Morbid Ideation: no   Suicide Assessment: no suicidal ideation, plan, or intent  Homicidal Ideation: no    History:    Medications:   Current Outpatient Medications   Medication Sig Dispense Refill    ARIPiprazole (ABILIFY) 15 MG tablet Take 1 tablet by mouth daily 30 tablet 0    oxybutynin (DITROPAN XL) 10 MG extended release tablet Take 1 tablet by mouth daily 30 tablet 3    busPIRone (BUSPAR) 15 MG tablet Take 15 mg by mouth 2 times daily 60 tablet 0    venlafaxine (EFFEXOR XR) 150 MG extended

## 2023-08-14 NOTE — PATIENT INSTRUCTIONS
Mind Traps    Individuals fall into \"mind traps\" which lead to greater dissatisfaction with situations. Adjusting these beliefs can lead to greater satisfaction.

## 2023-09-06 DIAGNOSIS — F41.9 ANXIETY AND DEPRESSION: ICD-10-CM

## 2023-09-06 DIAGNOSIS — F32.A ANXIETY AND DEPRESSION: ICD-10-CM

## 2023-09-06 RX ORDER — ARIPIPRAZOLE 15 MG/1
15 TABLET ORAL DAILY
Qty: 30 TABLET | Refills: 0 | Status: SHIPPED | OUTPATIENT
Start: 2023-09-06 | End: 2023-10-06

## 2023-09-06 RX ORDER — VENLAFAXINE HYDROCHLORIDE 150 MG/1
150 CAPSULE, EXTENDED RELEASE ORAL DAILY
Qty: 30 CAPSULE | Refills: 0 | Status: SHIPPED | OUTPATIENT
Start: 2023-09-06 | End: 2023-10-06

## 2023-09-06 NOTE — PROGRESS NOTES
Refill Request     Last Seen: Last Seen Department: 8/11/2023  Last Seen by PCP: Visit date not found    Last Written: 8/11/23 and 7/19/23        Next Appointment:   Future Appointments   Date Time Provider [address]   9/14/2023 11:30 AM Sonda Miller, APRN - CNP [place] - [facility]   10/4/2023 11:30 AM Sonda Miller, APRN - CNP [place] - [facility]       Requested Prescriptions     Pending Prescriptions Disp Refills    venlafaxine (EFFEXOR XR) 150 MG extended release capsule 30 capsule 0     Sig: Take 1 capsule by mouth daily    ARIPiprazole (ABILIFY) 15 MG tablet 30 tablet 0     Sig: Take 1 tablet by mouth daily

## 2023-09-14 ENCOUNTER — OFFICE VISIT (OUTPATIENT)
Dept: FAMILY MEDICINE CLINIC | Age: 48
End: 2023-09-14
Payer: COMMERCIAL

## 2023-09-14 VITALS
OXYGEN SATURATION: 99 % | BODY MASS INDEX: 33.98 KG/M2 | DIASTOLIC BLOOD PRESSURE: 80 MMHG | WEIGHT: 190 LBS | SYSTOLIC BLOOD PRESSURE: 114 MMHG | HEART RATE: 92 BPM

## 2023-09-14 DIAGNOSIS — F33.1 MODERATE EPISODE OF RECURRENT MAJOR DEPRESSIVE DISORDER (HCC): Primary | ICD-10-CM

## 2023-09-14 PROCEDURE — 1036F TOBACCO NON-USER: CPT

## 2023-09-14 PROCEDURE — G8417 CALC BMI ABV UP PARAM F/U: HCPCS

## 2023-09-14 PROCEDURE — G8427 DOCREV CUR MEDS BY ELIG CLIN: HCPCS

## 2023-09-14 PROCEDURE — 99213 OFFICE O/P EST LOW 20 MIN: CPT

## 2023-09-14 RX ORDER — OXYBUTYNIN CHLORIDE 10 MG/1
10 TABLET, EXTENDED RELEASE ORAL 2 TIMES DAILY
Qty: 30 TABLET | Refills: 3 | Status: SHIPPED | OUTPATIENT
Start: 2023-09-14

## 2023-09-14 RX ORDER — ARIPIPRAZOLE 15 MG/1
15 TABLET ORAL DAILY
Qty: 90 TABLET | Refills: 0 | Status: SHIPPED | OUTPATIENT
Start: 2023-09-14 | End: 2023-12-13

## 2023-09-14 RX ORDER — VENLAFAXINE HYDROCHLORIDE 75 MG/1
75 CAPSULE, EXTENDED RELEASE ORAL DAILY
Qty: 90 CAPSULE | Refills: 0 | Status: SHIPPED | OUTPATIENT
Start: 2023-09-14 | End: 2023-12-13

## 2023-09-14 RX ORDER — VENLAFAXINE HYDROCHLORIDE 150 MG/1
150 CAPSULE, EXTENDED RELEASE ORAL DAILY
Qty: 90 CAPSULE | Refills: 0 | Status: SHIPPED | OUTPATIENT
Start: 2023-09-14 | End: 2023-12-13

## 2023-09-14 ASSESSMENT — ANXIETY QUESTIONNAIRES
7. FEELING AFRAID AS IF SOMETHING AWFUL MIGHT HAPPEN: 0
3. WORRYING TOO MUCH ABOUT DIFFERENT THINGS: 0
GAD7 TOTAL SCORE: 0
1. FEELING NERVOUS, ANXIOUS, OR ON EDGE: 0
4. TROUBLE RELAXING: 0
2. NOT BEING ABLE TO STOP OR CONTROL WORRYING: 0
IF YOU CHECKED OFF ANY PROBLEMS ON THIS QUESTIONNAIRE, HOW DIFFICULT HAVE THESE PROBLEMS MADE IT FOR YOU TO DO YOUR WORK, TAKE CARE OF THINGS AT HOME, OR GET ALONG WITH OTHER PEOPLE: NOT DIFFICULT AT ALL
6. BECOMING EASILY ANNOYED OR IRRITABLE: 0
5. BEING SO RESTLESS THAT IT IS HARD TO SIT STILL: 0

## 2023-09-14 ASSESSMENT — ENCOUNTER SYMPTOMS
SORE THROAT: 0
CONSTIPATION: 0
WHEEZING: 0
COUGH: 0
BLOOD IN STOOL: 0
SHORTNESS OF BREATH: 0
COLOR CHANGE: 0
DIARRHEA: 0
ABDOMINAL PAIN: 0

## 2023-09-14 ASSESSMENT — PATIENT HEALTH QUESTIONNAIRE - PHQ9
3. TROUBLE FALLING OR STAYING ASLEEP: 2
8. MOVING OR SPEAKING SO SLOWLY THAT OTHER PEOPLE COULD HAVE NOTICED. OR THE OPPOSITE, BEING SO FIGETY OR RESTLESS THAT YOU HAVE BEEN MOVING AROUND A LOT MORE THAN USUAL: 0
7. TROUBLE CONCENTRATING ON THINGS, SUCH AS READING THE NEWSPAPER OR WATCHING TELEVISION: 2
SUM OF ALL RESPONSES TO PHQ QUESTIONS 1-9: 12
5. POOR APPETITE OR OVEREATING: 2
6. FEELING BAD ABOUT YOURSELF - OR THAT YOU ARE A FAILURE OR HAVE LET YOURSELF OR YOUR FAMILY DOWN: 0
SUM OF ALL RESPONSES TO PHQ QUESTIONS 1-9: 12
2. FEELING DOWN, DEPRESSED OR HOPELESS: 2
1. LITTLE INTEREST OR PLEASURE IN DOING THINGS: 2
10. IF YOU CHECKED OFF ANY PROBLEMS, HOW DIFFICULT HAVE THESE PROBLEMS MADE IT FOR YOU TO DO YOUR WORK, TAKE CARE OF THINGS AT HOME, OR GET ALONG WITH OTHER PEOPLE: 1
SUM OF ALL RESPONSES TO PHQ QUESTIONS 1-9: 12
9. THOUGHTS THAT YOU WOULD BE BETTER OFF DEAD, OR OF HURTING YOURSELF: 0
4. FEELING TIRED OR HAVING LITTLE ENERGY: 2
SUM OF ALL RESPONSES TO PHQ9 QUESTIONS 1 & 2: 4
SUM OF ALL RESPONSES TO PHQ QUESTIONS 1-9: 12

## 2023-09-14 NOTE — PROGRESS NOTES
Carroll Powell (:  1975) is a 41 y.o. female,Established patient, here for evaluation of the following chief complaint(s): Anxiety (4  week f/u )         ASSESSMENT/PLAN:  1. Moderate episode of recurrent major depressive disorder (HCC)  -     ARIPiprazole (ABILIFY) 15 MG tablet; Take 1 tablet by mouth daily, Disp-90 tablet, R-0Normal  -     venlafaxine (EFFEXOR XR) 150 MG extended release capsule; Take 1 capsule by mouth daily, Disp-90 capsule, R-0Normal  -     venlafaxine (EFFEXOR XR) 75 MG extended release capsule; Take 1 capsule by mouth daily, Disp-90 capsule, R-0Normal  -Plan at this time is to increase Effexor to 225 mg. Patient is agreeable and understands plan at this time. Patient was educated that she will take 1 tablet of moderate to kilograms in conjunction with the 75 mg tablet. Patient will continue Abilify 15 mg. Patient was educated on side effects medication. Patient was educated on worsening mood and when to seek further care. Patient is agreeable and understands plan at this time. Patient will continue to see therapist.  Patient was educated if she has any worsening mood that she will seek emergency help. Patient is agreeable and understands plan at this time. Return in about 3 months (around 2023) for Mood . Subjective   SUBJECTIVE/OBJECTIVE:  HPI  Patient presents to the office today for follow-up regarding depression anxiety. Patient endorses that she feels her anxiety is well controlled with her current regimen of buspirone and hydroxyzine. Patient states that she is going to work more. 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 She states that she still struggling with follow-up on or ministration of work however he is actually going to work and participating at work.   Patient endorses that she still having some depression however she is doing much better getting out of her room and be more

## 2023-10-04 DIAGNOSIS — F33.1 MODERATE EPISODE OF RECURRENT MAJOR DEPRESSIVE DISORDER (HCC): ICD-10-CM

## 2023-10-04 RX ORDER — VENLAFAXINE HYDROCHLORIDE 150 MG/1
CAPSULE, EXTENDED RELEASE ORAL DAILY
Qty: 30 CAPSULE | Refills: 0 | Status: SHIPPED | OUTPATIENT
Start: 2023-10-04

## 2023-10-04 RX ORDER — ARIPIPRAZOLE 15 MG/1
15 TABLET ORAL DAILY
Qty: 30 TABLET | Refills: 0 | Status: SHIPPED | OUTPATIENT
Start: 2023-10-04

## 2023-10-04 NOTE — TELEPHONE ENCOUNTER
Refill Request       Last Seen: Last Seen Department: 9/14/2023  Last Seen by PCP: 9/14/2023    Last Written: 09/14/2023         Next Appointment:   Future Appointments   Date Time Provider [facility] [address]   12/20/2023 11:30 AM Maria Alejandra Larios, APRN - CNP [place]         Requested Prescriptions     Pending Prescriptions Disp Refills    ARIPiprazole (ABILIFY) 15 MG tablet [Pharmacy Med Name: ARIPIPRAZOLE 15 MG TABLET] 30 tablet 0     Sig: TAKE 1 TABLET BY MOUTH EVERY DAY    venlafaxine (EFFEXOR XR) 150 MG extended release capsule [Pharmacy Med Name: VENLAFAXINE HCL  MG CAP] 30 capsule 0     Sig: TAKE 1 CAPSULE BY MOUTH EVERY DAY

## 2023-10-25 DIAGNOSIS — F33.1 MODERATE EPISODE OF RECURRENT MAJOR DEPRESSIVE DISORDER (HCC): ICD-10-CM

## 2023-10-25 DIAGNOSIS — F32.A ANXIETY AND DEPRESSION: ICD-10-CM

## 2023-10-25 DIAGNOSIS — F41.9 ANXIETY AND DEPRESSION: ICD-10-CM

## 2023-10-25 RX ORDER — VENLAFAXINE HYDROCHLORIDE 150 MG/1
150 CAPSULE, EXTENDED RELEASE ORAL DAILY
Qty: 90 CAPSULE | Refills: 0 | Status: SHIPPED | OUTPATIENT
Start: 2023-10-25

## 2023-10-25 RX ORDER — VENLAFAXINE HYDROCHLORIDE 75 MG/1
75 CAPSULE, EXTENDED RELEASE ORAL DAILY
Qty: 90 CAPSULE | Refills: 0 | Status: SHIPPED | OUTPATIENT
Start: 2023-10-25 | End: 2024-01-23

## 2023-10-25 RX ORDER — BUSPIRONE HYDROCHLORIDE 15 MG/1
15 TABLET ORAL 2 TIMES DAILY
Qty: 180 TABLET | Refills: 0 | Status: SHIPPED | OUTPATIENT
Start: 2023-10-25

## 2023-10-25 RX ORDER — HYDROXYZINE HYDROCHLORIDE 25 MG/1
25 TABLET, FILM COATED ORAL EVERY 8 HOURS PRN
Qty: 90 TABLET | Refills: 0 | Status: SHIPPED | OUTPATIENT
Start: 2023-10-25

## 2023-10-25 NOTE — TELEPHONE ENCOUNTER
Refill Request         Last Seen: Last Seen Department: 9/14/2023  Last Seen by PCP: 9/14/2023    Last Written: 09/14/2023         Next Appointment:   Future Appointments   Date Time Provider [address]   12/20/2023 11:30 AM Ifrah Larios, APRN - CNP [place]         Requested Prescriptions     Pending Prescriptions Disp Refills    venlafaxine (EFFEXOR XR) 150 MG extended release capsule 90 capsule 0     Sig: Take 1 capsule by mouth daily    busPIRone (BUSPAR) 15 MG tablet 180 tablet 0     Sig: Take 15 mg by mouth 2 times daily    venlafaxine (EFFEXOR XR) 75 MG extended release capsule 90 capsule 0     Sig: Take 1 capsule by mouth daily    hydrOXYzine HCl (ATARAX) 25 MG tablet 90 tablet 0     Sig: Take 1 tablet by mouth every 8 hours as needed for Anxiety

## 2023-11-27 RX ORDER — HYDROXYZINE HYDROCHLORIDE 25 MG/1
25 TABLET, FILM COATED ORAL EVERY 8 HOURS PRN
Qty: 90 TABLET | Refills: 0 | Status: SHIPPED | OUTPATIENT
Start: 2023-11-27

## 2023-11-27 NOTE — TELEPHONE ENCOUNTER
Refill Request     Last Seen: Last Seen Department: 9/14/2023  Last Seen by PCP: 9/14/2023    Last Written: atarax - 10/25/2023, 90 tablets, 0 refills      Next Appointment:   Future Appointments   Date Time Provider [address]  [facility]   12/20/2023 11:30 AM Yazmin Larios, APRN - CNP [place]           Requested Prescriptions     Pending Prescriptions Disp Refills    hydrOXYzine HCl (ATARAX) 25 MG tablet [Pharmacy Med Name: HYDROXYZINE HCL 25 MG TABLET] 90 tablet 0     Sig: TAKE 1 TABLET BY MOUTH EVERY 8 HOURS AS NEEDED FOR ANXIETY

## 2024-01-08 ENCOUNTER — OFFICE VISIT (OUTPATIENT)
Dept: FAMILY MEDICINE CLINIC | Age: 49
End: 2024-01-08
Payer: COMMERCIAL

## 2024-01-08 VITALS
WEIGHT: 204.2 LBS | DIASTOLIC BLOOD PRESSURE: 86 MMHG | BODY MASS INDEX: 36.18 KG/M2 | OXYGEN SATURATION: 100 % | SYSTOLIC BLOOD PRESSURE: 126 MMHG | HEART RATE: 90 BPM | HEIGHT: 63 IN | RESPIRATION RATE: 16 BRPM

## 2024-01-08 DIAGNOSIS — E03.9 HYPOTHYROIDISM, UNSPECIFIED TYPE: ICD-10-CM

## 2024-01-08 DIAGNOSIS — E55.9 VITAMIN D DEFICIENCY: ICD-10-CM

## 2024-01-08 DIAGNOSIS — N39.41 URGENCY INCONTINENCE: ICD-10-CM

## 2024-01-08 DIAGNOSIS — E66.09 CLASS 2 OBESITY DUE TO EXCESS CALORIES WITHOUT SERIOUS COMORBIDITY WITH BODY MASS INDEX (BMI) OF 36.0 TO 36.9 IN ADULT: Primary | ICD-10-CM

## 2024-01-08 DIAGNOSIS — F33.1 MODERATE EPISODE OF RECURRENT MAJOR DEPRESSIVE DISORDER (HCC): ICD-10-CM

## 2024-01-08 LAB
25(OH)D3 SERPL-MCNC: 22.9 NG/ML
ALBUMIN SERPL-MCNC: 4 G/DL (ref 3.4–5)
ALBUMIN/GLOB SERPL: 1.4 {RATIO} (ref 1.1–2.2)
ALP SERPL-CCNC: 60 U/L (ref 40–129)
ALT SERPL-CCNC: 16 U/L (ref 10–40)
ANION GAP SERPL CALCULATED.3IONS-SCNC: 7 MMOL/L (ref 3–16)
AST SERPL-CCNC: 18 U/L (ref 15–37)
BASOPHILS # BLD: 0 K/UL (ref 0–0.2)
BASOPHILS NFR BLD: 0.1 %
BILIRUB SERPL-MCNC: 0.4 MG/DL (ref 0–1)
BUN SERPL-MCNC: 13 MG/DL (ref 7–20)
CALCIUM SERPL-MCNC: 8.6 MG/DL (ref 8.3–10.6)
CHLORIDE SERPL-SCNC: 102 MMOL/L (ref 99–110)
CO2 SERPL-SCNC: 27 MMOL/L (ref 21–32)
CREAT SERPL-MCNC: 0.7 MG/DL (ref 0.6–1.1)
DEPRECATED RDW RBC AUTO: 14.2 % (ref 12.4–15.4)
EOSINOPHIL # BLD: 0 K/UL (ref 0–0.6)
EOSINOPHIL NFR BLD: 0 %
FOLATE SERPL-MCNC: 5.09 NG/ML (ref 4.78–24.2)
GFR SERPLBLD CREATININE-BSD FMLA CKD-EPI: >60 ML/MIN/{1.73_M2}
GLUCOSE SERPL-MCNC: 83 MG/DL (ref 70–99)
HCT VFR BLD AUTO: 38.2 % (ref 36–48)
HGB BLD-MCNC: 12.4 G/DL (ref 12–16)
LYMPHOCYTES # BLD: 1.7 K/UL (ref 1–5.1)
LYMPHOCYTES NFR BLD: 26.7 %
MCH RBC QN AUTO: 26.8 PG (ref 26–34)
MCHC RBC AUTO-ENTMCNC: 32.5 G/DL (ref 31–36)
MCV RBC AUTO: 82.3 FL (ref 80–100)
MONOCYTES # BLD: 0.5 K/UL (ref 0–1.3)
MONOCYTES NFR BLD: 8.3 %
NEUTROPHILS # BLD: 4.2 K/UL (ref 1.7–7.7)
NEUTROPHILS NFR BLD: 64.9 %
PLATELET # BLD AUTO: 318 K/UL (ref 135–450)
PMV BLD AUTO: 9.4 FL (ref 5–10.5)
POTASSIUM SERPL-SCNC: 4.5 MMOL/L (ref 3.5–5.1)
PROT SERPL-MCNC: 6.8 G/DL (ref 6.4–8.2)
RBC # BLD AUTO: 4.64 M/UL (ref 4–5.2)
SODIUM SERPL-SCNC: 136 MMOL/L (ref 136–145)
TSH SERPL DL<=0.005 MIU/L-ACNC: 7.76 UIU/ML (ref 0.27–4.2)
VIT B12 SERPL-MCNC: 408 PG/ML (ref 211–911)
WBC # BLD AUTO: 6.5 K/UL (ref 4–11)

## 2024-01-08 PROCEDURE — 99214 OFFICE O/P EST MOD 30 MIN: CPT

## 2024-01-08 RX ORDER — VENLAFAXINE HYDROCHLORIDE 75 MG/1
75 CAPSULE, EXTENDED RELEASE ORAL DAILY
Qty: 90 CAPSULE | Refills: 0 | Status: SHIPPED | OUTPATIENT
Start: 2024-01-08 | End: 2024-04-07

## 2024-01-08 RX ORDER — LEVOTHYROXINE SODIUM 0.03 MG/1
25 TABLET ORAL DAILY
Qty: 90 TABLET | Refills: 0 | Status: SHIPPED | OUTPATIENT
Start: 2024-01-08 | End: 2024-01-09 | Stop reason: SDUPTHER

## 2024-01-08 RX ORDER — ARIPIPRAZOLE 15 MG/1
15 TABLET ORAL DAILY
Qty: 90 TABLET | Refills: 0 | Status: SHIPPED | OUTPATIENT
Start: 2024-01-08 | End: 2024-04-07

## 2024-01-08 RX ORDER — BUSPIRONE HYDROCHLORIDE 15 MG/1
15 TABLET ORAL 2 TIMES DAILY
Qty: 180 TABLET | Refills: 0 | Status: SHIPPED | OUTPATIENT
Start: 2024-01-08 | End: 2024-04-07

## 2024-01-08 RX ORDER — VENLAFAXINE HYDROCHLORIDE 150 MG/1
150 CAPSULE, EXTENDED RELEASE ORAL DAILY
Qty: 90 CAPSULE | Refills: 0 | Status: SHIPPED | OUTPATIENT
Start: 2024-01-08 | End: 2024-04-07

## 2024-01-08 RX ORDER — HYDROXYZINE HYDROCHLORIDE 25 MG/1
25 TABLET, FILM COATED ORAL EVERY 8 HOURS PRN
Qty: 90 TABLET | Refills: 0 | Status: SHIPPED | OUTPATIENT
Start: 2024-01-08

## 2024-01-08 RX ORDER — OXYBUTYNIN CHLORIDE 15 MG/1
15 TABLET, EXTENDED RELEASE ORAL 2 TIMES DAILY
Qty: 60 TABLET | Refills: 0 | Status: SHIPPED | OUTPATIENT
Start: 2024-01-08 | End: 2024-02-07

## 2024-01-08 ASSESSMENT — PATIENT HEALTH QUESTIONNAIRE - PHQ9
SUM OF ALL RESPONSES TO PHQ QUESTIONS 1-9: 8
5. POOR APPETITE OR OVEREATING: 3
SUM OF ALL RESPONSES TO PHQ9 QUESTIONS 1 & 2: 2
1. LITTLE INTEREST OR PLEASURE IN DOING THINGS: 1
2. FEELING DOWN, DEPRESSED OR HOPELESS: 1
SUM OF ALL RESPONSES TO PHQ QUESTIONS 1-9: 8
9. THOUGHTS THAT YOU WOULD BE BETTER OFF DEAD, OR OF HURTING YOURSELF: 0
SUM OF ALL RESPONSES TO PHQ QUESTIONS 1-9: 8
4. FEELING TIRED OR HAVING LITTLE ENERGY: 1
7. TROUBLE CONCENTRATING ON THINGS, SUCH AS READING THE NEWSPAPER OR WATCHING TELEVISION: 0
10. IF YOU CHECKED OFF ANY PROBLEMS, HOW DIFFICULT HAVE THESE PROBLEMS MADE IT FOR YOU TO DO YOUR WORK, TAKE CARE OF THINGS AT HOME, OR GET ALONG WITH OTHER PEOPLE: 1
6. FEELING BAD ABOUT YOURSELF - OR THAT YOU ARE A FAILURE OR HAVE LET YOURSELF OR YOUR FAMILY DOWN: 0
8. MOVING OR SPEAKING SO SLOWLY THAT OTHER PEOPLE COULD HAVE NOTICED. OR THE OPPOSITE, BEING SO FIGETY OR RESTLESS THAT YOU HAVE BEEN MOVING AROUND A LOT MORE THAN USUAL: 1
SUM OF ALL RESPONSES TO PHQ QUESTIONS 1-9: 8
3. TROUBLE FALLING OR STAYING ASLEEP: 1

## 2024-01-08 ASSESSMENT — ENCOUNTER SYMPTOMS
CONSTIPATION: 0
COLOR CHANGE: 0
SORE THROAT: 0
SHORTNESS OF BREATH: 0
BLOOD IN STOOL: 0
ABDOMINAL PAIN: 0
DIARRHEA: 0
COUGH: 0
WHEEZING: 0

## 2024-01-08 NOTE — PROGRESS NOTES
Fina Mclean (:  1975) is a 48 y.o. female,Established patient, here for evaluation of the following chief complaint(s):  Depression (Pt is here for a follow up )         ASSESSMENT/PLAN:  1. Class 2 obesity due to excess calories without serious comorbidity with body mass index (BMI) of 36.0 to 36.9 in adult  -     Hemoglobin A1C; Future  -     Vitamin D 25 Hydroxy; Future  - Patient does present to the office today with an increase in weight has steadily been increasing since starting mood medications.  She is on venlafaxine which is a weight neutral medication however is on Abilify which can cause weight gain.  Patient's mood is finally stable with medication regimen did have lengthy discussion with patient today regarding changing medication.  After discussion with patient at this time it is more appropriate to really work on diet and exercise to help manage weight.  Did also have lengthy discussion regarding weight loss medications.  Do not feel that these would be the most appropriate at this given time given patient's weight is most likely related to mood stabilizing medications.  She may have had slight dietary changes since starting medications would encourage her to continue working on diet and exercise before considering medication changes or adding weight loss medications.  Wt Readings from Last 3 Encounters:   24 92.6 kg (204 lb 3.2 oz)   23 86.2 kg (190 lb)   23 84.1 kg (185 lb 6.4 oz)      2. Hypothyroidism, unspecified type  -     levothyroxine (SYNTHROID) 25 MCG tablet; Take 1 tablet by mouth daily, Disp-90 tablet, R-0Normal  - Given weight gain we will recheck thyroid at this time no other clinical symptoms of hypothyroid.  Will titrate medication based off thyroid level.  Refills were  3. Moderate episode of recurrent major depressive disorder (HCC)  -     ARIPiprazole (ABILIFY) 15 MG tablet; Take 1 tablet by mouth daily, Disp-90 tablet, R-0Normal  -     busPIRone

## 2024-01-09 LAB
EST. AVERAGE GLUCOSE BLD GHB EST-MCNC: 105.4 MG/DL
HBA1C MFR BLD: 5.3 %

## 2024-01-09 RX ORDER — ACETAMINOPHEN 160 MG
2 TABLET,DISINTEGRATING ORAL DAILY
Qty: 180 CAPSULE | Refills: 0 | Status: SHIPPED | OUTPATIENT
Start: 2024-01-09 | End: 2024-04-08

## 2024-01-09 RX ORDER — LEVOTHYROXINE SODIUM 0.05 MG/1
50 TABLET ORAL DAILY
Qty: 90 TABLET | Refills: 0 | Status: SHIPPED | OUTPATIENT
Start: 2024-01-09 | End: 2024-04-08

## 2024-01-22 DIAGNOSIS — F33.1 MODERATE EPISODE OF RECURRENT MAJOR DEPRESSIVE DISORDER (HCC): ICD-10-CM

## 2024-01-22 RX ORDER — ARIPIPRAZOLE 15 MG/1
15 TABLET ORAL DAILY
Qty: 90 TABLET | Refills: 0 | Status: SHIPPED | OUTPATIENT
Start: 2024-01-22 | End: 2024-04-21

## 2024-01-22 NOTE — TELEPHONE ENCOUNTER
Refill Request     Last Seen: Last Seen Department: 1/8/2024  Last Seen by PCP: 1/8/2024    Last Written: 1/8/24    Next Appointment:   Future Appointments   Date Time Provider Department Center   4/9/2024  9:00 AM Frankie Larios APRN - CNP [place] [facility]       Requested Prescriptions      No prescriptions requested or ordered in this encounter